# Patient Record
Sex: FEMALE | Race: WHITE | NOT HISPANIC OR LATINO | Employment: OTHER | URBAN - METROPOLITAN AREA
[De-identification: names, ages, dates, MRNs, and addresses within clinical notes are randomized per-mention and may not be internally consistent; named-entity substitution may affect disease eponyms.]

---

## 2020-06-29 ENCOUNTER — HISTORICAL (OUTPATIENT)
Dept: RADIOLOGY | Facility: HOSPITAL | Age: 35
End: 2020-06-29

## 2020-09-16 ENCOUNTER — HISTORICAL (OUTPATIENT)
Dept: CARDIOLOGY | Facility: HOSPITAL | Age: 35
End: 2020-09-16

## 2020-12-04 ENCOUNTER — HOSPITAL ENCOUNTER (OUTPATIENT)
Dept: OBSTETRICS AND GYNECOLOGY | Facility: HOSPITAL | Age: 35
End: 2020-12-06
Attending: OBSTETRICS & GYNECOLOGY | Admitting: OBSTETRICS & GYNECOLOGY

## 2020-12-04 LAB
ABS NEUT (OLG): 4.7 X10(3)/MCL (ref 2.1–9.2)
ALBUMIN SERPL-MCNC: 3.9 GM/DL (ref 3.5–5)
ALBUMIN/GLOB SERPL: 1.4 RATIO (ref 1.1–2)
ALP SERPL-CCNC: 78 UNIT/L (ref 40–150)
ALT SERPL-CCNC: 20 UNIT/L (ref 0–55)
ANISOCYTOSIS BLD QL SMEAR: ABNORMAL
APPEARANCE, UA: CLEAR
APTT PPP: 24.2 SECOND(S) (ref 23.2–33.7)
AST SERPL-CCNC: 14 UNIT/L (ref 5–34)
BACTERIA SPEC CULT: ABNORMAL /HPF
BASOPHILS # BLD AUTO: 0.1 X10(3)/MCL (ref 0–0.2)
BASOPHILS NFR BLD AUTO: 2 %
BILIRUB SERPL-MCNC: 0.3 MG/DL
BILIRUB UR QL STRIP: NEGATIVE
BILIRUBIN DIRECT+TOT PNL SERPL-MCNC: 0.1 MG/DL (ref 0–0.5)
BILIRUBIN DIRECT+TOT PNL SERPL-MCNC: 0.2 MG/DL (ref 0–0.8)
BUN SERPL-MCNC: 8.4 MG/DL (ref 7–18.7)
CALCIUM SERPL-MCNC: 8.7 MG/DL (ref 8.4–10.2)
CHLORIDE SERPL-SCNC: 107 MMOL/L (ref 98–107)
CO2 SERPL-SCNC: 23 MMOL/L (ref 22–29)
COLOR UR: YELLOW
CREAT SERPL-MCNC: 0.71 MG/DL (ref 0.55–1.02)
CROSSMATCH INTERPRETATION: NORMAL
EOSINOPHIL # BLD AUTO: 0.1 X10(3)/MCL (ref 0–0.9)
EOSINOPHIL NFR BLD AUTO: 2 %
ERYTHROCYTE [DISTWIDTH] IN BLOOD BY AUTOMATED COUNT: 15.5 % (ref 11.5–17)
GLOBULIN SER-MCNC: 2.7 GM/DL (ref 2.4–3.5)
GLUCOSE (UA): NEGATIVE
GLUCOSE SERPL-MCNC: 124 MG/DL (ref 74–100)
GROUP & RH: NORMAL
HCT VFR BLD AUTO: 22.2 % (ref 37–47)
HGB BLD-MCNC: 6 GM/DL (ref 12–16)
HGB UR QL STRIP: ABNORMAL
HYPOCHROMIA BLD QL SMEAR: ABNORMAL
IMM GRANULOCYTES # BLD AUTO: 0 10*3/UL
IMM GRANULOCYTES NFR BLD AUTO: 3 %
INR PPP: 1 (ref 0–1.3)
KETONES UR QL STRIP: NEGATIVE
LEUKOCYTE ESTERASE UR QL STRIP: NEGATIVE
LYMPHOCYTES # BLD AUTO: 1.2 X10(3)/MCL (ref 0.6–4.6)
LYMPHOCYTES NFR BLD AUTO: 18 %
MCH RBC QN AUTO: 20.5 PG (ref 27–31)
MCHC RBC AUTO-ENTMCNC: 27 GM/DL (ref 33–36)
MCV RBC AUTO: 75.8 FL (ref 80–94)
MICROCYTES BLD QL SMEAR: ABNORMAL
MONOCYTES # BLD AUTO: 0.4 X10(3)/MCL (ref 0.1–1.3)
MONOCYTES NFR BLD AUTO: 6 %
NEUTROPHILS # BLD AUTO: 4.7 X10(3)/MCL (ref 2.1–9.2)
NEUTROPHILS NFR BLD AUTO: 69 %
NITRITE UR QL STRIP: NEGATIVE
PH UR STRIP: 7.5 [PH] (ref 5–9)
PLATELET # BLD AUTO: 257 X10(3)/MCL (ref 130–400)
PLATELET # BLD EST: NORMAL 10*3/UL
PMV BLD AUTO: 10.5 FL (ref 9.4–12.4)
POIKILOCYTOSIS BLD QL SMEAR: ABNORMAL
POLYCHROMASIA BLD QL SMEAR: ABNORMAL
POTASSIUM SERPL-SCNC: 4.3 MMOL/L (ref 3.5–5.1)
PRODUCT READY: NORMAL
PROT SERPL-MCNC: 6.6 GM/DL (ref 6.4–8.3)
PROT UR QL STRIP: NEGATIVE
PROTHROMBIN TIME: 13.3 SECOND(S) (ref 11.1–13.7)
RBC # BLD AUTO: 2.93 X10(6)/MCL (ref 4.2–5.4)
RBC #/AREA URNS HPF: 5 /HPF (ref 0–2)
RBC MORPH BLD: ABNORMAL
SODIUM SERPL-SCNC: 141 MMOL/L (ref 136–145)
SP GR UR STRIP: 1.01 (ref 1–1.03)
SQUAMOUS EPITHELIAL, UA: ABNORMAL
UROBILINOGEN UR STRIP-ACNC: 0.2
WBC # SPEC AUTO: 6.8 X10(3)/MCL (ref 4.5–11.5)
WBC #/AREA URNS HPF: ABNORMAL /[HPF]

## 2020-12-05 LAB
ABS NEUT (OLG): 3.53 X10(3)/MCL (ref 2.1–9.2)
ANISOCYTOSIS BLD QL SMEAR: 1
BASOPHILS NFR BLD MANUAL: 4 % (ref 0–2)
EOSINOPHIL NFR BLD MANUAL: 4 % (ref 0–8)
ERYTHROCYTE [DISTWIDTH] IN BLOOD BY AUTOMATED COUNT: 17.2 % (ref 11.5–17)
HCT VFR BLD AUTO: 25.6 % (ref 37–47)
HGB BLD-MCNC: 7.4 GM/DL (ref 12–16)
HYPOCHROMIA BLD QL SMEAR: 1
LYMPHOCYTES NFR BLD MANUAL: 9 % (ref 13–40)
MACROCYTES BLD QL SMEAR: 1 /MCL
MCH RBC QN AUTO: 22.7 PG (ref 27–31)
MCHC RBC AUTO-ENTMCNC: 28.9 GM/DL (ref 33–36)
MCV RBC AUTO: 78.5 FL (ref 80–94)
MICROCYTES BLD QL SMEAR: 1
MONOCYTES NFR BLD MANUAL: 1 % (ref 2–11)
NEUTROPHILS NFR BLD MANUAL: 82 % (ref 47–80)
PLATELET # BLD AUTO: 239 X10(3)/MCL (ref 130–400)
PLATELET # BLD EST: ADEQUATE 10*3/UL
PMV BLD AUTO: 10.6 FL (ref 9.4–12.4)
POLYCHROMASIA BLD QL SMEAR: 1
PRODUCT READY: NORMAL
RBC # BLD AUTO: 3.26 X10(6)/MCL (ref 4.2–5.4)
RBC MORPH BLD: ABNORMAL
WBC # SPEC AUTO: 5.8 X10(3)/MCL (ref 4.5–11.5)

## 2020-12-06 LAB
ABS NEUT (OLG): 3.58 X10(3)/MCL (ref 2.1–9.2)
BASOPHILS # BLD AUTO: 0.1 X10(3)/MCL (ref 0–0.2)
BASOPHILS NFR BLD AUTO: 2 %
EOSINOPHIL # BLD AUTO: 0.2 X10(3)/MCL (ref 0–0.9)
EOSINOPHIL NFR BLD AUTO: 4 %
ERYTHROCYTE [DISTWIDTH] IN BLOOD BY AUTOMATED COUNT: 17.3 % (ref 11.5–17)
HCT VFR BLD AUTO: 30.9 % (ref 37–47)
HGB BLD-MCNC: 9.3 GM/DL (ref 12–16)
LYMPHOCYTES # BLD AUTO: 1.3 X10(3)/MCL (ref 0.6–4.6)
LYMPHOCYTES NFR BLD AUTO: 22 %
MCH RBC QN AUTO: 24.2 PG (ref 27–31)
MCHC RBC AUTO-ENTMCNC: 30.1 GM/DL (ref 33–36)
MCV RBC AUTO: 80.3 FL (ref 80–94)
MONOCYTES # BLD AUTO: 0.4 X10(3)/MCL (ref 0.1–1.3)
MONOCYTES NFR BLD AUTO: 6 %
NEUTROPHILS # BLD AUTO: 3.58 X10(3)/MCL (ref 2.1–9.2)
NEUTROPHILS NFR BLD AUTO: 60 %
PLATELET # BLD AUTO: 253 X10(3)/MCL (ref 130–400)
PMV BLD AUTO: 10.5 FL (ref 9.4–12.4)
RBC # BLD AUTO: 3.85 X10(6)/MCL (ref 4.2–5.4)
WBC # SPEC AUTO: 6 X10(3)/MCL (ref 4.5–11.5)

## 2021-01-19 ENCOUNTER — HISTORICAL (OUTPATIENT)
Dept: BARIATRICS | Facility: HOSPITAL | Age: 36
End: 2021-01-19

## 2021-01-27 ENCOUNTER — HISTORICAL (OUTPATIENT)
Dept: BARIATRICS | Facility: HOSPITAL | Age: 36
End: 2021-01-27

## 2021-01-28 ENCOUNTER — HISTORICAL (OUTPATIENT)
Dept: ADMINISTRATIVE | Facility: HOSPITAL | Age: 36
End: 2021-01-28

## 2021-02-11 ENCOUNTER — HISTORICAL (OUTPATIENT)
Dept: BARIATRICS | Facility: HOSPITAL | Age: 36
End: 2021-02-11

## 2021-06-01 ENCOUNTER — HISTORICAL (OUTPATIENT)
Dept: BARIATRICS | Facility: HOSPITAL | Age: 36
End: 2021-06-01

## 2021-08-23 ENCOUNTER — HISTORICAL (OUTPATIENT)
Dept: BARIATRICS | Facility: HOSPITAL | Age: 36
End: 2021-08-23

## 2021-11-01 ENCOUNTER — HISTORICAL (OUTPATIENT)
Dept: BARIATRICS | Facility: HOSPITAL | Age: 36
End: 2021-11-01

## 2022-04-30 NOTE — H&P
Patient:   Lisa Celestin            MRN: [id number]            FIN: [id number]               Age:   35 years     Sex:  Female     :  1985   Associated Diagnoses:   Menorrhagia   Author:   Marnie Roa MD      Basic Information   Source of history:  Self.    Referral source:  Self.    History limitation:  None.       Chief Complaint   menorrhagia        History of Present Illness   Miss Celestin is a 35-year-old white female who presents to the emergency department with excessive uterine bleeding and symptomatic anemia. She gives a history of heavy menstrual cycles and was started on prog only ocp Slynd approx 4 weeks ago. She felt that this initially did control bleeding but since she got the to last row of the pills this week, the bleeding increased and she began to feel weak, SOB.       Review of Systems   All other systems.     Health Status   Allergies:    Allergic Reactions (Selected)  No Known Allergies,    Allergies (1) Active Reaction  No Known Allergies None Documented     Current medications:  (Selected)   Inpatient Medications  Ordered  Zofran: 4 mg, form: Injection, IV Push, q4hr PRN for nausea, first dose 20 19:11:00 CST  acetaminophen: 1,000 mg, form: Tab, Oral, q6hr PRN for pain, mild, first dose 20 19:11:00 CST  ibuprofen 600 mg oral tablet: 600 mg, form: Tab, Oral, q6hr PRN for pain, first dose 20 19:12:00 CST  tranexamic acid: 1,300 mg, form: Tab, Oral, TID, first dose 20 14:00:00 CST, give first PO dose 8 hours after IV dose  Prescriptions  Prescribed  ibuprofen 600 mg oral tablet: 600 mg = 1 tab(s), Oral, q6hr, PRN PRN pain, # 30 tab(s), 0 Refill(s), Pharmacy: [facility] [id number], 165, cm, Height/Length Dosing, 20 22:42:00 CST, 113, kg, Weight Dosing, 20 22:42:00 CST  tranexamic acid 650 mg oral tablet: 1,300 mg = 2 tab(s), Oral, TID, for a maximum of 5 days during monthly menstruation, # 180 tab(s), 0 Refill(s), Pharmacy:  [facility] 5773, 165, cm, Height/Length Dosing, 20 22:42:00 CST, 113, kg, Weight Dosing, 20 22:42:00...   Problem list:    All Problems  ulcers disese / SNOMED CT 0263260936 / Confirmed  Morbid obesity / SNOMED CT 779489859 / Probable  Reflux / SNOMED CT 12427003 / Confirmed  Right ankle pain / ICD-9-.47 / Confirmed  Right knee pain / ICD-9-.46 / Confirmed      Histories   Past Medical History:    Active  Reflux (40135303)  ulcers disese (9075856170)  Resolved  GERD - Gastro-esophageal reflux disease (9252682981):  Resolved.  PIH - Pregnancy-induced hypertension (439053855):  Resolved.  pregnancy (1857606760):  Resolved.  Cholelithiasis (758248486):  Resolved.  Migraines ([id number]):  Resolved.   Family History:    Entire family history is negative.   Procedure history:     Section (None) on 2016 at 30 Years.  Comments:  2016 9:03 CST - Charla Garnett  auto-populated from documented surgical case  C section ([id number]) in  at 27 Years.  Tonsillectomy (487731320).  upper GI series.  History of tonsillectomy (9493299362).  Cholecystectomy (32815367).   Social History        Social & Psychosocial Habits    Alcohol  2012 Risk Assessment: Denies Alcohol Use    2016  Type: Beer    Frequency: 1-2 times per week    Employment/School  10/02/2012  Highest education: Post graduate degree(s)    2012  Status: Employed    Highest education: University degree(s)    Nutrition/Health  2012  Type of diet: Regular    Caffeine intake amount: coke 1 / day    Sleeping concerns: No    Feels highly stressed: No    Substance Use  2012 Risk Assessment: Denies Substance Abuse    Tobacco  2012 Risk Assessment: Denies Tobacco Use    2020  Use: Never (less than 100 in l    Patient Wants Consult For Cessation Counseling No    Abuse/Neglect  2020  SHX Any signs of abuse or neglect  No    Feels unsafe at home: No    Safe place to go: Yes  .        Physical Examination   Vital Signs   12/6/2020 8:15 CST       Temperature Oral          36.6 DegC                             Temperature Oral (calculated)             97.88 DegF                             Peripheral Pulse Rate     85 bpm                             Respiratory Rate          20 br/min                             Systolic Blood Pressure   131 mmHg                             Diastolic Blood Pressure  83 mmHg                             Mean Arterial Pressure, Cuff              99 mmHg    12/6/2020 4:00 CST       Temperature Oral          36.5 DegC                             Temperature Oral (calculated)             97.70 DegF                             Peripheral Pulse Rate     98 bpm                             Systolic Blood Pressure   126 mmHg                             Diastolic Blood Pressure  78 mmHg                             Mean Arterial Pressure, Cuff              94 mmHg    12/5/2020 23:20 CST      Temperature Oral          36.7 DegC                             Temperature Oral (calculated)             98.06 DegF                             Peripheral Pulse Rate     103 bpm  HI                             Systolic Blood Pressure   134 mmHg                             Diastolic Blood Pressure  88 mmHg                             Mean Arterial Pressure, Cuff              103 mmHg    12/5/2020 20:29 CST      Temperature Oral          36.9 DegC                             Temperature Oral (calculated)             98.42 DegF                             Peripheral Pulse Rate     90 bpm                             Systolic Blood Pressure   131 mmHg                             Diastolic Blood Pressure  83 mmHg                             Mean Arterial Pressure, Cuff              99 mmHg    12/5/2020 19:50 CST      Temperature Oral          36.8 DegC                             Temperature Oral (calculated)             98.24  DegF                             Peripheral Pulse Rate     94 bpm                             Systolic Blood Pressure   133 mmHg                             Diastolic Blood Pressure  86 mmHg    12/5/2020 17:10 CST      Temperature Oral          36.8 DegC                             Heart Rate Monitored      92 bpm                             Systolic Blood Pressure   134 mmHg                             Diastolic Blood Pressure  83 mmHg                             Mean Arterial Pressure, Cuff              100 mmHg    12/5/2020 17:00 CST      Temperature Oral          36.9 DegC                             Heart Rate Monitored      97 bpm                             Systolic Blood Pressure   130 mmHg                             Diastolic Blood Pressure  90 mmHg                             Mean Arterial Pressure, Cuff              103 mmHg    12/5/2020 16:54 CST      Temperature Oral          36.9 DegC                             Temperature Oral (calculated)             98.42 DegF                             Heart Rate Monitored      97                             Systolic Blood Pressure   123                             Diastolic Blood Pressure  78    12/5/2020 16:30 CST      Temperature Oral          37.1 DegC                             Temperature Oral (calculated)             98.78 DegF                             Heart Rate Monitored      97                             Systolic Blood Pressure   132                             Diastolic Blood Pressure  84    12/5/2020 16:25 CST      Temperature Oral          37 DegC                             Temperature Oral (calculated)             98.60 DegF                             Peripheral Pulse Rate     96 bpm                             Respiratory Rate          20 br/min                             Systolic Blood Pressure   135 mmHg                             Diastolic Blood Pressure  76 mmHg                             Mean Arterial Pressure, Cuff              96  mmHg    12/5/2020 13:23 CST      Temperature Oral          36.8 DegC                             Temperature Oral (calculated)             98.24 DegF                             Heart Rate Monitored      97 bpm                             Respiratory Rate          16 br/min                             Systolic Blood Pressure   124 mmHg                             Diastolic Blood Pressure  79 mmHg                             Mean Arterial Pressure, Cuff              94 mmHg    12/5/2020 13:05 CST      Temperature Oral          36.8 DegC                             Temperature Oral (calculated)             98.24 DegF                             Heart Rate Monitored      97                             Respiratory Rate          16                             Systolic Blood Pressure   126                             Diastolic Blood Pressure  71    12/5/2020 12:33 CST      Temperature Oral          36.8 DegC                             Temperature Oral (calculated)             98.24 DegF                             Peripheral Pulse Rate     99 bpm                             Respiratory Rate          20 br/min                             Systolic Blood Pressure   136 mmHg                             Diastolic Blood Pressure  89 mmHg                             Mean Arterial Pressure, Cuff              105 mmHg    12/5/2020 8:01 CST       Temperature Oral          36.7 DegC                             Temperature Oral (calculated)             98.06 DegF                             Peripheral Pulse Rate     96 bpm                             Respiratory Rate          20 br/min                             Systolic Blood Pressure   150 mmHg  HI                             Diastolic Blood Pressure  95 mmHg  HI                             Mean Arterial Pressure, Cuff              113 mmHg    12/5/2020 8:00 CST       Oxygen Therapy            Room air    12/5/2020 4:00 CST       Temperature Oral          36.9 DegC                              Temperature Oral (calculated)             98.42 DegF                             Peripheral Pulse Rate     94 bpm                             Systolic Blood Pressure   130 mmHg                             Diastolic Blood Pressure  79 mmHg                             Mean Arterial Pressure, Cuff              96 mmHg    12/5/2020 0:41 CST       Temperature Oral          36.7 DegC                             Temperature Oral (calculated)             98.06 DegF                             Peripheral Pulse Rate     99 bpm                             Systolic Blood Pressure   112 mmHg                             Diastolic Blood Pressure  68 mmHg                             Mean Arterial Pressure, Cuff              83 mmHg     Additional physical exam information:  GEN: A&O x 4  CV: RRR  Lungs: CTAB  ABD: S/NT/ND  Ext: no edema   , : Examination the clare-pad revealed some small amount of blood no blood clots noted..       Review / Management   Results review:     Labs (Last four charted values)  WBC                  6.0 (DEC 06) 5.8 (DEC 05) 6.8 (DEC 04)   Hgb                  L 9.3 (DEC 06) L 7.4 (DEC 05) L 6.0 (DEC 04)   Hct                  L 30.9 (DEC 06) L 25.6 (DEC 05) L 22.2 (DEC 04)   Plt                  253 (DEC 06) 239 (DEC 05) 257 (DEC 04)   Na                   141 (DEC 04)   K                    4.3 (DEC 04)   CO2                  23 (DEC 04)   Cl                   107 (DEC 04)   Cr                   0.71 (DEC 04)   BUN                  8.4 (DEC 04)   Glucose Random       H 124 (DEC 04)   PT                   13.3 (DEC 04)   INR                  1.0 (DEC 04)   PTT                  24.2 (DEC 04) .       Impression and Plan   Diagnosis     Menorrhagia (XIA09-OK N92.0).     Course:  s/p 4 units PRBC adn appropriate improvement in counts  pt states bleeding is imprtoving on TXA  will discharge home on this same med  hold Slynd for now  f/u in office for EMB and discussion of further mgmt.

## 2022-04-30 NOTE — OP NOTE
Patient:   Lisa Celestin            MRN: [id number]            FIN: [id number]               Age:   35 years     Sex:  Female     :  1985   Associated Diagnoses:   HEARTBURN; Hernia, hiatal   Author:   Ted Higginbotham MD      Operative Note   Operative Information   Date/ Time:  2021 22:22:00.     Procedures Performed: Procedure Code   89446- EGD FLEX TRANSORAL DX W/BRUSHING/WASHING (None) on 2021 at 35 Years.  Comments:  2021 13:19 BRADEN - Nish العراقي, Danii Decker  auto-populated from documented surgical case, Esophagogastroduodenoscopy.     Preoperative Diagnosis: HEARTBURN ([id number] R12).     Postoperative Diagnosis: HEARTBURN (NDB13-AM R12), Hernia, hiatal (WXA71-SM K44.9).     Surgeon: Ted Higginbotham MD.     Anesthesia: MAC.     Description of Procedure/Findings/    Complications: Patient was taken to the OR.  The patient's throat was aneshtetized.  MAC was utilized for anesthesia.   was easily passed.  Findings were as follows:  -Oropharynx: normal  -Airway: normal  -Esophagus: normal  -Stomach: normal mucosa   -Duodenum: normal.     Esimated blood loss: No blood loss.     Complications: None.

## 2022-04-30 NOTE — ED PROVIDER NOTES
"   Patient:   Lisa Celestin            MRN: [id number]            FIN: [id number]               Age:   35 years     Sex:  Female     :  1985   Associated Diagnoses:   Symptomatic anemia; Menorrhagia   Author:   Natalie Jacobson MD      Basic Information   Time seen: Date & time 2020 12:52:00.   History source: Patient.   Arrival mode: Private vehicle.   History limitation: None.   Additional information: Chief Complaint from Nursing Triage Note : Chief Complaint   2020 12:50 CST      Chief Complaint           Pt to ED states sent by MD for low h/h 6.3/22.7. States she was told she would need to have transfusion done. States having inc vaginal bleeding for past few "months". inc sob today.  (Modified) .   Provider/Visit info:    No qualifying data available.   .   History of Present Illness   The patient presents with weakness, dizziness.  The onset was chronic.  The course/duration of symptoms is worsening.  Location: generalized. The character of symptoms is bleeding.     The patient presents with 36y/o F presents to the ED for abdnormal H&H of /. States vaginal bleeding for the past couple of month. Sent her by Dr. Roa. Deepak SAENZ and INatalie MD, assumed care of this patient.     35-year-old female presents the emergency department recommendation of her ObGyn for evaluation of her outpatient labs demonstrated significant anemia in the context of several months of uncontrolled vaginal bleeding.  She was seen by her ObGyn a few weeks ago and reportedly had an ultrasound performed at that time with no acute findings.  She has been on birth control for 1 month but has not noted significant difference in her symptoms.  Reports that her dyspnea has been getting worse.  She denies any chest pain.  She does have some orthostatic dizziness as well.  Reports that the bleeding is actually on the lighter side of her experience however it is still quite heavy..        Review " of Systems   Constitutional symptoms:  Weakness, fatigue, No fever,    Respiratory symptoms:  Shortness of breath, No cough,    Cardiovascular symptoms:  No chest pain,    Gastrointestinal symptoms:  No abdominal pain, no nausea, no vomiting.    Genitourinary symptoms:  Vaginal bleeding.   Neurologic symptoms:  Dizziness.             Additional review of systems information: All other systems reviewed and otherwise negative.      Health Status   Allergies:    Allergic Reactions (Selected)  No Known Allergies,    Allergies (1) Active Reaction  No Known Allergies None Documented  .   Medications:  (Selected)   Documented Medications  Documented  Percocet 5/325: 1 tab(s), Oral, q6hr, PRN PRN pain, 0 Refill(s)  Percocet 5/325: 2 tab(s), Oral, q6hr, PRN PRN pain, 0 Refill(s)  omeprazole 40 mg oral delayed release capsule: 40 mg = 1 cap(s), Oral, Daily, # 30 cap(s), 0 Refill(s)  ranitidine: 1 tab, Oral, At Bedtime, 0 Refill(s)  sertraline 50 mg oral capsule: Daily, 0 Refill(s).      Past Medical/ Family/ Social History   Medical history:    Active  Reflux (19277622)  ulcers disese (8219603719)  Resolved  GERD - Gastro-esophageal reflux disease (6274971294):  Resolved.  PIH - Pregnancy-induced hypertension (902520101):  Resolved.  pregnancy (2384629990):  Resolved.  Cholelithiasis (884087709):  Resolved.  Migraines ([id number]):  Resolved., Reviewed as documented in chart.   Surgical history:     Section (None) on 2016 at 30 Years.  Comments:  2016 9:03 BRADEN - Charla Garnett  auto-populated from documented surgical case  C section ([id number]) in  at 27 Years.  Tonsillectomy (452757803).  upper GI series.  History of tonsillectomy (4501786156).  Cholecystectomy (39633659)., Reviewed as documented in chart.   Family history:    Entire family history is negative..   Social history:    Social & Psychosocial Habits    Alcohol  2012 Risk  Assessment: Denies Alcohol Use    02/16/2016  Type: Beer    Frequency: 1-2 times per week    Employment/School  10/02/2012  Highest education: Post graduate degree(s)    02/27/2012  Status: Employed    Highest education: University degree(s)    Nutrition/Health  02/27/2012  Type of diet: Regular    Caffeine intake amount: coke 1 / day    Sleeping concerns: No    Feels highly stressed: No    Substance Use  02/27/2012 Risk Assessment: Denies Substance Abuse    Tobacco  02/27/2012 Risk Assessment: Denies Tobacco Use  , Reviewed as documented in chart.      Physical Examination               Vital Signs   Vital Signs   12/4/2020 12:50 CST      Temperature Oral          37.3 DegC                             Temperature Oral (calculated)             99.14 DegF                             Peripheral Pulse Rate     122 bpm  HI                             Respiratory Rate          18 br/min                             SpO2                      100 %                             Systolic Blood Pressure   154 mmHg  HI                             Diastolic Blood Pressure  80 mmHg  .      Vital Signs (last 24 hrs)_____  Last Charted___________  Temp Oral     37.3 DegC  (DEC 04 12:50)  Heart Rate Peripheral   H 122bpm  (DEC 04 12:50)  Resp Rate         18 br/min  (DEC 04 12:50)  SBP      H 154mmHg  (DEC 04 12:50)  DBP      80 mmHg  (DEC 04 12:50)  SpO2      100 %  (DEC 04 12:50)  .   Oxygen saturation.   General:  Alert, no acute distress.    Skin:  Warm, dry, pale.    Head:  Normocephalic, atraumatic.    Neck:  Supple, trachea midline.    Eye:  Conjunctiva: Both eyes, pale.   Ears, nose, mouth and throat:  Oral mucosa moist.   Cardiovascular:  Regular rate and rhythm, Normal peripheral perfusion, No edema, Tachycardia.    Respiratory:  Respirations are non-labored.   Gastrointestinal:  Soft, Nontender, Non distended.    Neurological:  Alert and oriented to person, place, time, and situation.   Psychiatric:  Cooperative.       Medical Decision Making   Rationale:  Patient referred to ED with symptomatic anemia due to menorrhagia for the last several months.  Hemoglobin 6, she is tachycardic but normotensive. Discussed with her OBGYN who agreed w/ admission under observation for transfusion. Findings and plan discussed with the patient, and she is agreeable to admission at this time.   .   Documents reviewed:  Emergency department nurses' notes.   Orders  Launch Orders   Laboratory:  UA with Reflex (Order): Stat collect, Urine, 12/4/2020 12:54 CST, Nurse collect, Print Label By Order Location  POC UPT ED (Order): Urine, Stat collect, Collected, 12/4/2020 12:54 CST by Yecenia SAENZ, Debbie ARAGON Nurse collect, Print Label By Order Location  PTT (Order): Stat collect, 12/4/2020 12:54 CST, Blood, Lab Collect, Print Label By Order Location, 12/4/2020 12:54 CST  PT (Order): Stat collect, 12/4/2020 12:54 CST, Blood, Lab Collect, Print Label By Order Location, 12/4/2020 12:54 CST  Type and Crossmatch 1st order (Order): 12/4/2020 12:53 CST, Stat collect, Blood, Lab Collect, Packed RBC, To Keep Ahead, 2, 12/4/2020, Print Label By Order Location  Type and Rh (Order Processing)  Antibody Screen for transfusion  (Indirect Binh) (Order Processing)  Xmatch (Order Processing)  Red Blood Cells (Order Processing)  CMP (Order): Stat collect, 12/4/2020 12:53 CST, Blood, Lab Collect, Print Label By Order Location, 12/4/2020 12:53 CST  CBC w/ Auto Diff (Order): Now collect, 12/4/2020 12:53 CST, Blood, Lab Collect, Print Label By Order Location, 12/4/2020 12:53 CST.   Results review:  Lab results : Lab View   12/4/2020 13:55 CST      Est Creat Clearance Ser   101.25 mL/min    12/4/2020 13:45 CST      U beta hCG Ql POC         Negative    12/4/2020 13:15 CST      UA Appear                 CLEAR                             UA Color                  YELLOW                             UA Spec Grav              1.007                             UA Bili                    Negative                             UA pH                     7.5                             UA Urobilinogen           0.2                             UA Blood                  2+                             UA Glucose                Negative                             UA Ketones                Negative                             UA Protein                Negative                             UA Nitrite                Negative                             UA Leuk Est               Negative                             UA WBC                    NONE SEEN                             UA RBC                    5 /HPF  HI                             UA Bacteria               NONE SEEN /HPF                             UA Squam Epithelial       NONE SEEN    12/4/2020 13:02 CST      Sodium Lvl                141 mmol/L                             Potassium Lvl             4.3 mmol/L                             Chloride                  107 mmol/L                             CO2                       23 mmol/L                             Calcium Lvl               8.7 mg/dL                             Glucose Lvl               124 mg/dL  HI                             BUN                       8.4 mg/dL                             Creatinine                0.71 mg/dL                             eGFR-AA                   >60  NA                             eGFR-JULITO                  >60 mL/min/1.73 m2  NA                             Bili Total                0.3 mg/dL                             Bili Direct               0.1 mg/dL                             Bili Indirect             0.20 mg/dL                             AST                       14 unit/L                             ALT                       20 unit/L                             Alk Phos                  78 unit/L                             Total Protein             6.6 gm/dL                             Albumin Lvl               3.9 gm/dL                              Globulin                  2.7 gm/dL                             A/G Ratio                 1.4 ratio                             PT                        13.3 second(s)                             INR                       1.0                             PTT                       24.2 second(s)                             WBC                       6.8 x10(3)/mcL                             RBC                       2.93 x10(6)/mcL  LOW                             Hgb                       6.0 gm/dL  LOW                             Hct                       22.2 %  LOW                             Platelet                  257 x10(3)/mcL                             MCV                       75.8 fL  LOW                             MCH                       20.5 pg  LOW                             MCHC                      27.0 gm/dL  LOW                             RDW                       15.5 %                             MPV                       10.5 fL                             Abs Neut                  4.70 x10(3)/mcL                             Neutro Auto               69 %  NA                             Lymph Auto                18 %  NA                             Mono Auto                 6 %  NA                             Eos Auto                  2 %  NA                             Abs Eos                   0.1 x10(3)/mcL                             Basophil Auto             2 %  NA                             Abs Neutro                4.70 x10(3)/mcL                             Abs Lymph                 1.2 x10(3)/mcL                             Abs Mono                  0.4 x10(3)/mcL                             Abs Baso                  0.1 x10(3)/mcL                             IG%                       3  NA                             IG#                       0  NA                             Hypochrom                 1+                             Platelet Est               Normal                             Anisocyte                 1+                             Poik                      1+                             Microcyte                 1+                             Polychrom                 1+                             RBC Morph                 Abnormal                             ABO/Rh                    A POS                             Antibody Screen           Neg                             Product Ready             2 AVAILABLE  , Interpretation Abnormal results  microcytic anemia.       Impression and Plan   Diagnosis   Symptomatic anemia (HTW00-RC D64.9)   Menorrhagia (NJM50-KZ N92.0)   Plan   Condition: Stable.    Disposition: Admit time  12/4/2020 15:11:00, Place in Observation Unit, Crispin JOHNSON, Marnie BOWMAN    Counseled: Patient, Regarding diagnosis, Regarding diagnostic results, Regarding treatment plan, Patient indicated understanding of instructions.

## 2022-04-30 NOTE — DISCHARGE SUMMARY
Patient:   Lisa Celestin            MRN: [id number]            FIN: [id number]               Age:   35 years     Sex:  Female     :  1985   Associated Diagnoses:   None   Author:   Marnie Roa MD      Admission Date:   20  Discharge Date: 20    Principle Diagnosis: DUB, acute blood loss anemia  Principle Procedure:  transfusion PRBC    Brief History and Hospital course:   Admitted with symptomatic anemia due to heavy menstrual bleeding  transfused 4 units PRBC with appropriate rise in H/H  bleeding controlled with TXA  d/c home with precautions and close f/u

## 2023-07-27 DIAGNOSIS — K44.9 ESOPHAGEAL HIATAL HERNIA: Primary | ICD-10-CM

## 2023-07-27 DIAGNOSIS — K20.90 ESOPHAGITIS: ICD-10-CM

## 2023-08-02 ENCOUNTER — HOSPITAL ENCOUNTER (OUTPATIENT)
Dept: RADIOLOGY | Facility: HOSPITAL | Age: 38
Discharge: HOME | End: 2023-08-02
Attending: INTERNAL MEDICINE
Payer: COMMERCIAL

## 2023-08-02 DIAGNOSIS — K44.9 ESOPHAGEAL HIATAL HERNIA: ICD-10-CM

## 2023-08-02 DIAGNOSIS — K20.90 ESOPHAGITIS: ICD-10-CM

## 2023-08-02 PROCEDURE — A9698 NON-RAD CONTRAST MATERIALNOC: HCPCS | Performed by: INTERNAL MEDICINE

## 2023-08-02 PROCEDURE — 74248 X-RAY SM INT F-THRU STD: CPT | Mod: TC

## 2023-08-02 PROCEDURE — 25500020 PHARM REV CODE 255: Performed by: INTERNAL MEDICINE

## 2023-08-02 PROCEDURE — 74240 X-RAY XM UPR GI TRC 1CNTRST: CPT | Mod: TC

## 2023-08-02 RX ADMIN — BARIUM SULFATE 320 ML: 0.6 SUSPENSION ORAL at 12:08

## 2023-08-02 RX ADMIN — Medication 176 G: at 12:08

## 2023-09-28 ENCOUNTER — TELEPHONE (OUTPATIENT)
Dept: SURGERY | Facility: CLINIC | Age: 38
End: 2023-09-28
Payer: COMMERCIAL

## 2023-09-28 DIAGNOSIS — Z91.89 ELECTROLYTE IMBALANCE RISK: ICD-10-CM

## 2023-09-28 DIAGNOSIS — Z13.21 ENCOUNTER FOR VITAMIN DEFICIENCY SCREENING: ICD-10-CM

## 2023-09-28 DIAGNOSIS — Z13.0 SCREENING FOR IRON DEFICIENCY ANEMIA: Primary | ICD-10-CM

## 2023-09-28 NOTE — TELEPHONE ENCOUNTER
Pt coming in for upcoming appt. No labs recently done. Spoke with pt she will go to [facility] in [place] next week. Orders placed

## 2023-10-10 ENCOUNTER — OFFICE VISIT (OUTPATIENT)
Dept: SURGERY | Facility: CLINIC | Age: 38
End: 2023-10-10
Payer: COMMERCIAL

## 2023-10-10 VITALS
BODY MASS INDEX: 32.49 KG/M2 | DIASTOLIC BLOOD PRESSURE: 92 MMHG | HEART RATE: 79 BPM | SYSTOLIC BLOOD PRESSURE: 131 MMHG | WEIGHT: 195 LBS | HEIGHT: 65 IN

## 2023-10-10 DIAGNOSIS — K21.9 GASTROESOPHAGEAL REFLUX DISEASE, UNSPECIFIED WHETHER ESOPHAGITIS PRESENT: Primary | ICD-10-CM

## 2023-10-10 PROCEDURE — 3008F BODY MASS INDEX DOCD: CPT | Mod: CPTII,,, | Performed by: SURGERY

## 2023-10-10 PROCEDURE — 99214 OFFICE O/P EST MOD 30 MIN: CPT | Mod: ,,, | Performed by: SURGERY

## 2023-10-10 PROCEDURE — 1159F PR MEDICATION LIST DOCUMENTED IN MEDICAL RECORD: ICD-10-PCS | Mod: CPTII,,, | Performed by: SURGERY

## 2023-10-10 PROCEDURE — 3075F PR MOST RECENT SYSTOLIC BLOOD PRESS GE 130-139MM HG: ICD-10-PCS | Mod: CPTII,,, | Performed by: SURGERY

## 2023-10-10 PROCEDURE — 3080F PR MOST RECENT DIASTOLIC BLOOD PRESSURE >= 90 MM HG: ICD-10-PCS | Mod: CPTII,,, | Performed by: SURGERY

## 2023-10-10 PROCEDURE — 1159F MED LIST DOCD IN RCRD: CPT | Mod: CPTII,,, | Performed by: SURGERY

## 2023-10-10 PROCEDURE — 99214 PR OFFICE/OUTPT VISIT, EST, LEVL IV, 30-39 MIN: ICD-10-PCS | Mod: ,,, | Performed by: SURGERY

## 2023-10-10 PROCEDURE — 3080F DIAST BP >= 90 MM HG: CPT | Mod: CPTII,,, | Performed by: SURGERY

## 2023-10-10 PROCEDURE — 3008F PR BODY MASS INDEX (BMI) DOCUMENTED: ICD-10-PCS | Mod: CPTII,,, | Performed by: SURGERY

## 2023-10-10 PROCEDURE — 3075F SYST BP GE 130 - 139MM HG: CPT | Mod: CPTII,,, | Performed by: SURGERY

## 2023-10-10 RX ORDER — DULOXETIN HYDROCHLORIDE 60 MG/1
60 CAPSULE, DELAYED RELEASE ORAL 2 TIMES DAILY
COMMUNITY
Start: 2023-09-25 | End: 2023-11-30

## 2023-10-10 RX ORDER — MONTELUKAST SODIUM 10 MG/1
10 TABLET ORAL DAILY
COMMUNITY
Start: 2023-09-21

## 2023-10-10 RX ORDER — ESOMEPRAZOLE MAGNESIUM 40 MG/1
40 CAPSULE, DELAYED RELEASE ORAL 2 TIMES DAILY
COMMUNITY
Start: 2023-09-15 | End: 2024-02-08 | Stop reason: ALTCHOICE

## 2023-10-10 RX ORDER — BUPROPION HYDROCHLORIDE 150 MG/1
150 TABLET ORAL EVERY MORNING
COMMUNITY
Start: 2023-10-09 | End: 2023-11-30

## 2023-10-10 RX ORDER — TIRZEPATIDE 5 MG/.5ML
0.5 INJECTION, SOLUTION SUBCUTANEOUS
COMMUNITY
Start: 2023-08-31 | End: 2024-02-08 | Stop reason: ALTCHOICE

## 2023-10-10 RX ORDER — LORAZEPAM 0.5 MG/1
.5-1 TABLET ORAL
COMMUNITY
Start: 2023-08-10 | End: 2023-11-30

## 2023-10-10 RX ORDER — FAMOTIDINE 40 MG/1
40 TABLET, FILM COATED ORAL NIGHTLY
COMMUNITY
Start: 2023-08-31 | End: 2024-03-19

## 2023-10-10 RX ORDER — TRAZODONE HYDROCHLORIDE 100 MG/1
100 TABLET ORAL NIGHTLY
COMMUNITY
Start: 2023-09-27

## 2023-10-10 RX ORDER — SUCRALFATE 1 G/1
TABLET ORAL
Status: ON HOLD | COMMUNITY
Start: 2023-07-31 | End: 2024-02-26

## 2023-10-10 NOTE — PROGRESS NOTES
[facility] - General Surgery Services  [address] [place] [id number]  Phone: [phone]  Fax: [phone]     HISTORY & PHYSICAL  General Surgery  Dr. eTd Higginbotham      Patient Name: Lisa Celestin  YOB: 1985  Author: Ted Higginbotham MD     Date: 10/10/2023                   SUBJECTIVE:     Chief Complaint/Reason for Admission:   Chief Complaint   Patient presents with    Follow-up     VSG 2/1/2021        History of Present Illness:  Ms. Lisa Celestin is a 38 y.o. female who presents to clinic for surgical evaluation of intractable reflux.     Positive symptoms:   Heartburn, Regurgitation, Dyspepsia, Upper Abdominal Pain, Nausea, and Vomiting    Previous treatments:   PPI, H2 Antagonist, OTC Antacids, and Rx Carafate    Referring provider: No ref. provider found   Patient Care Team:  Jevon Escobar MD as PCP - General (Family Medicine)     Pertinent workup:    FL Upper GI With Small Bowel (xpd)  Narrative: EXAMINATION:  FL UPPER GI WITH SMALL BOWEL (XPD)    CLINICAL HISTORY:  Diaphragmatic hernia without obstruction or gangrene    TECHNIQUE:   images were obtained of the abdomen.  Patient performed several barium swallows for this exam multiple fluoroscopic images were obtained of the esophagus and the stomach.  Additional oral contrast was given and serial abdomen images acquired.    Fluoroscopy time 1 minutes 22 seconds    Radiation dose 267 mGy    Cumulative DAP 11.06 uGym2    Total of 98 fluoroscopic images were acquired    COMPARISON:  None available    FINDINGS:   images of the abdomen show bilateral upper abdomen metallic clips.  Bowel gas pattern is nonspecific nonobstructive.  No apparent organomegaly.    Timing and emptying of barium from the hypopharynx is unremarkable.  Esophageal unremarkable primary and secondary peristalsis and there were no tertiary waveforms.  There is diffuse mild dilatation of the esophagus  without stricture and there is no diverticulum.  There was gastroesophageal reflux during the exam.  There is no significant hiatal hernia.  There was no aspiration or regurgitation.    Stomach is of small volume related to gastric sleeve.  There is no evidence of gastric sleeve migration above the level of the gastroesophageal junction.  No neofundus identified.  Contrast promptly transited from the stomach into the duodenum.  Duodenum shows unremarkable distensibility and folds.  There was initial rapid transit of contrast through nondistended loops of small bowel and opacification of loops of ileum was seen within 15 minutes.  Subsequent transit of contrast was rather slow and opacification of the colon was seen 2 hours into study.  Spot images of the small bowel were performed which is are without significant persistent stricture or abnormal dilatation.  Impression: 1. Dilated esophagus    2. Gastroesophageal reflux disease.    3. Status post gastric sleeve.    Electronically signed by: Farhad Turner  Date:    2023  Time:    16:06      Review of Systems:  12 point ROS negative except as stated in HPI    PAST HISTORY:     Past Medical History:   Diagnosis Date    Chronic back pain     Depression     GERD (gastroesophageal reflux disease)     History of stomach ulcers     Hypertension     IBS (irritable bowel syndrome)     Irregular heart beat     Ringling grade D esophagitis     Sleep apnea, unspecified      Past Surgical History:   Procedure Laterality Date     SECTION      CHOLECYSTECTOMY  10/03/2012    COLONOSCOPY      ESOPHAGOGASTRODUODENOSCOPY      LAPAROSCOPIC SLEEVE GASTRECTOMY  2021    Dr. Higginbotham     Family History   Problem Relation Age of Onset    Hypertension Maternal Grandmother      Social History     Socioeconomic History    Marital status:    Tobacco Use    Smoking status: Never    Smokeless tobacco: Never   Substance and Sexual Activity    Alcohol use: Not Currently  "   Drug use: Never       MEDICATIONS & ALLERGIES:     Current Outpatient Medications on File Prior to Visit   Medication Sig    buPROPion (WELLBUTRIN XL) 150 MG TB24 tablet Take 150 mg by mouth every morning.    DULoxetine (CYMBALTA) 60 MG capsule Take 60 mg by mouth 2 (two) times daily.    esomeprazole (NEXIUM) 40 MG capsule Take 40 mg by mouth 2 (two) times daily.    famotidine (PEPCID) 40 MG tablet Take 40 mg by mouth every evening.    LORazepam (ATIVAN) 0.5 MG tablet Take 0.5-1 mg by mouth.    montelukast (SINGULAIR) 10 mg tablet Take 10 mg by mouth.    MOUNJARO 5 mg/0.5 mL PnIj Inject 0.5 mg into the skin every 7 days.    sucralfate (CARAFATE) 1 gram tablet     traZODone (DESYREL) 100 MG tablet Take 100 mg by mouth every evening.     No current facility-administered medications on file prior to visit.     Review of patient's allergies indicates:  No Known Allergies    OBJECTIVE:     Vitals:    10/10/23 1539   BP: (!) 131/92   Pulse: 79   Weight: 88.5 kg (195 lb)   Height: 5' 5" (1.651 m)     Body mass index is 32.45 kg/m².    Physical Exam:  General:  Well developed, well nourished, no acute distress  HEENT:  Normocephalic, atraumatic, PERRL, EOMI, clear sclera, neck supple  CVS:  RRR, S1 and S2 normal, no murmurs, rubs, gallops  Resp:  Lungs clear to auscultation, no wheezes, rales, rhonchi, cough  GI:  Abdomen soft, non-tender, non-distended, normoactive bowel sounds, no masses  :   Deferred  MSK:  No muscle atrophy, cyanosis, peripheral edema, full range of motion  Skin:  No rashes, ulcers, erythema  Neuro:  CNII-XII grossly intact  Psych:  Alert and oriented to person, place, and time    Results:  I have independently reviewed all pertinent lab and radiologic studies relevant to general surgery.      VISIT DIAGNOSES:   No diagnosis found.    ASSESSMENT/PLAN:     Plan: Laproscopic Gastrojejunostomy    - Order esophagram, esophageal manometry/motility study, EGD if not done within 12 months.     - " Anahy discussed surgical options for anti-reflux procedure. Complete workup listed above then plan to have pt return to clinic to discuss surgery more in detail. Dr. Higginbotham discussed non-surgical options (continued medical mgmt, dietary/lifestyle changes) vs. surgical intervention (Laparoscopic Nissen fundoplication vs. Toupet fundoplication (if decreased motility)vs. Shanthi Nissen gastroplasty), Laparoscopic repair of hiatal hernia, and magnetic sphincter augmentation Linx procedure.         - Dr. Higginbotham examined patient, discussed recommendations, obtained informed consent, and answered all questions with patient/family.     - Counseling included differential diagnoses, treatment options, risks and benefits, lifestyle changes, prognosis, and medications.    The patient was interactive, and verbalized understanding.    Ted Higginbotham MD

## 2023-10-11 ENCOUNTER — TELEPHONE (OUTPATIENT)
Dept: SURGERY | Facility: CLINIC | Age: 38
End: 2023-10-11
Payer: COMMERCIAL

## 2023-10-11 DIAGNOSIS — K21.9 GASTROESOPHAGEAL REFLUX DISEASE, UNSPECIFIED WHETHER ESOPHAGITIS PRESENT: Primary | ICD-10-CM

## 2023-10-11 DIAGNOSIS — K31.89 RETAINED FOOD IN STOMACH: ICD-10-CM

## 2023-10-11 DIAGNOSIS — R63.5 WEIGHT GAIN: ICD-10-CM

## 2023-10-13 LAB
ALBUMIN SERPL-MCNC: 4.9 G/DL (ref 3.9–4.9)
ALBUMIN/GLOB SERPL: 2 {RATIO} (ref 1.2–2.2)
ALP SERPL-CCNC: 54 IU/L (ref 44–121)
ALT SERPL-CCNC: 11 IU/L (ref 0–32)
AST SERPL-CCNC: 13 IU/L (ref 0–40)
BASOPHILS # BLD AUTO: 0.2 X10E3/UL (ref 0–0.2)
BASOPHILS NFR BLD AUTO: 2 %
BILIRUB SERPL-MCNC: 0.4 MG/DL (ref 0–1.2)
BUN SERPL-MCNC: 10 MG/DL (ref 6–20)
BUN/CREAT SERPL: 13 (ref 9–23)
CALCIUM SERPL-MCNC: 9.8 MG/DL (ref 8.7–10.2)
CHLORIDE SERPL-SCNC: 104 MMOL/L (ref 96–106)
CO2 SERPL-SCNC: 23 MMOL/L (ref 20–29)
CREAT SERPL-MCNC: 0.8 MG/DL (ref 0.57–1)
EOSINOPHIL # BLD AUTO: 0.2 X10E3/UL (ref 0–0.4)
EOSINOPHIL NFR BLD AUTO: 2 %
ERYTHROCYTE [DISTWIDTH] IN BLOOD BY AUTOMATED COUNT: 12.4 % (ref 11.7–15.4)
EST. GFR  (NO RACE VARIABLE): 98 ML/MIN/1.73
GLOBULIN SER CALC-MCNC: 2.4 G/DL (ref 1.5–4.5)
GLUCOSE SERPL-MCNC: 88 MG/DL (ref 70–99)
HCT VFR BLD AUTO: 42.8 % (ref 34–46.6)
HGB BLD-MCNC: 14.7 G/DL (ref 11.1–15.9)
IMM GRANULOCYTES NFR BLD AUTO: 1 %
IRON SATN MFR SERPL: 43 % (ref 15–55)
IRON SERPL-MCNC: 123 UG/DL (ref 27–159)
LYMPHOCYTES # BLD AUTO: 2 X10E3/UL (ref 0.7–3.1)
LYMPHOCYTES NFR BLD AUTO: 27 %
MCH RBC QN AUTO: 30.8 PG (ref 26.6–33)
MCHC RBC AUTO-ENTMCNC: 34.3 G/DL (ref 31.5–35.7)
MCV RBC AUTO: 90 FL (ref 79–97)
MONOCYTES # BLD AUTO: 0.4 X10E3/UL (ref 0.1–0.9)
MONOCYTES NFR BLD AUTO: 5 %
NEUTROPHILS # BLD AUTO: 4.5 X10E3/UL (ref 1.4–7)
NEUTROPHILS NFR BLD AUTO: 63 %
PLATELET # BLD AUTO: 336 X10E3/UL (ref 150–450)
POTASSIUM SERPL-SCNC: 3.9 MMOL/L (ref 3.5–5.2)
PROT SERPL-MCNC: 7.3 G/DL (ref 6–8.5)
RBC # BLD AUTO: 4.78 X10E6/UL (ref 3.77–5.28)
SODIUM SERPL-SCNC: 141 MMOL/L (ref 134–144)
SPECIMEN STATUS REPORT: NORMAL
TIBC SERPL-MCNC: 289 UG/DL (ref 250–450)
UIBC SERPL-MCNC: 166 UG/DL (ref 131–425)
VIT B1 BLD-SCNC: 116.1 NMOL/L (ref 66.5–200)
VIT B12 SERPL-MCNC: 1354 PG/ML (ref 232–1245)
WBC # BLD AUTO: 7.2 X10E3/UL (ref 3.4–10.8)

## 2023-11-07 ENCOUNTER — TELEPHONE (OUTPATIENT)
Dept: SURGERY | Facility: HOSPITAL | Age: 38
End: 2023-11-07
Payer: COMMERCIAL

## 2023-11-07 NOTE — TELEPHONE ENCOUNTER
----- Message from Shelby Rodriguez MA sent at 11/7/2023 10:16 AM CST -----  Regarding: RE: Voicemail  Gastric Emptying was scheduled. Pt no showed.   Manometry scheduled 11/16/23  ----- Message -----  From: Violette Estrella MA  Sent: 11/6/2023   4:25 PM CST  To: MARY Guadarrama; Lisette Castelan RN; #  Subject: Voicemail                                        Pt called Lm stating that she is having pain under her breast near her ribs, radiating to her back.she feels it on both sides of her ribs. Lap antonio done 10/3/12- pt stated that it was  who done the surgery , pt denies vomiting but does have nausea.  PCP  told her to go to an urgent care but she was wondering if we need to order testing.   She did google the symptoms and thinks that this could be related taking Mounjaro, this has been going on for a few months now, please advise.

## 2023-11-07 NOTE — TELEPHONE ENCOUNTER
----- Message from Violette Estrella MA sent at 11/6/2023  4:06 PM CST -----  Regarding: Voicemail  Pt called Lm stating that she is having pain under her breast near her ribs, radiating to her back.she feels it on both sides of her ribs. Lap antonio done 10/3/12- pt stated that it was  who done the surgery , pt denies vomiting but does have nausea.  PCP  told her to go to an urgent care but she was wondering if we need to order testing.   She did google the symptoms and thinks that this could be related taking Mounjaro, this has been going on for a few months now, please advise.

## 2023-11-08 NOTE — TELEPHONE ENCOUNTER
I spoke with pt she RS Gastric emptying study to 11/14/23, Manometry scheduled for 11/16/23, she did stop the mounjaro about a week about, its now more of a discomfort rather then a hurting pain, Informed the pt that we will follow up on the test results and go from there at this time she can get on a low fat diet at this time and see If that helps with the discomfort, placed pt on a reminder for testing and ck status of how she is feeling.

## 2023-11-17 ENCOUNTER — TELEPHONE (OUTPATIENT)
Dept: SURGERY | Facility: CLINIC | Age: 38
End: 2023-11-17
Payer: COMMERCIAL

## 2023-11-17 NOTE — TELEPHONE ENCOUNTER
Discussed results with pt. Manometry completed yesterday. Results pending.  Plan to bring pt back in to discuss POC once we receive results. Pt in agreement

## 2023-11-17 NOTE — TELEPHONE ENCOUNTER
----- Message from MARY Guadarrama sent at 11/16/2023  3:28 PM CST -----  Yes. GE study normal.    Proceed with motility study.     Then EGD if not recently done.   ----- Message -----  From: Shelby Rodriguez MA  Sent: 11/15/2023   1:36 PM CST  To: MARY Guadarrama; Lisette Castelan RN; #    Gastric emptying study results. Normal. Please advise.   Motility scheduled 11/16/2023

## 2023-11-30 ENCOUNTER — OFFICE VISIT (OUTPATIENT)
Dept: SURGERY | Facility: CLINIC | Age: 38
End: 2023-11-30
Payer: COMMERCIAL

## 2023-11-30 VITALS
HEART RATE: 64 BPM | DIASTOLIC BLOOD PRESSURE: 84 MMHG | BODY MASS INDEX: 33.32 KG/M2 | HEIGHT: 65 IN | SYSTOLIC BLOOD PRESSURE: 124 MMHG | WEIGHT: 200 LBS

## 2023-11-30 DIAGNOSIS — Z90.3 HISTORY OF SLEEVE GASTRECTOMY: ICD-10-CM

## 2023-11-30 DIAGNOSIS — K22.4 ESOPHAGEAL DYSMOTILITY: ICD-10-CM

## 2023-11-30 DIAGNOSIS — K21.00 GASTROESOPHAGEAL REFLUX DISEASE WITH ESOPHAGITIS WITHOUT HEMORRHAGE: Primary | ICD-10-CM

## 2023-11-30 DIAGNOSIS — K44.9 HIATAL HERNIA: ICD-10-CM

## 2023-11-30 PROCEDURE — 3074F SYST BP LT 130 MM HG: CPT | Mod: CPTII,,, | Performed by: NURSE PRACTITIONER

## 2023-11-30 PROCEDURE — 3074F PR MOST RECENT SYSTOLIC BLOOD PRESSURE < 130 MM HG: ICD-10-PCS | Mod: CPTII,,, | Performed by: NURSE PRACTITIONER

## 2023-11-30 PROCEDURE — 3079F DIAST BP 80-89 MM HG: CPT | Mod: CPTII,,, | Performed by: NURSE PRACTITIONER

## 2023-11-30 PROCEDURE — 99214 OFFICE O/P EST MOD 30 MIN: CPT | Mod: ,,, | Performed by: NURSE PRACTITIONER

## 2023-11-30 PROCEDURE — 3008F PR BODY MASS INDEX (BMI) DOCUMENTED: ICD-10-PCS | Mod: CPTII,,, | Performed by: NURSE PRACTITIONER

## 2023-11-30 PROCEDURE — 3008F BODY MASS INDEX DOCD: CPT | Mod: CPTII,,, | Performed by: NURSE PRACTITIONER

## 2023-11-30 PROCEDURE — 3079F PR MOST RECENT DIASTOLIC BLOOD PRESSURE 80-89 MM HG: ICD-10-PCS | Mod: CPTII,,, | Performed by: NURSE PRACTITIONER

## 2023-11-30 PROCEDURE — 1159F MED LIST DOCD IN RCRD: CPT | Mod: CPTII,,, | Performed by: NURSE PRACTITIONER

## 2023-11-30 PROCEDURE — 99214 PR OFFICE/OUTPT VISIT, EST, LEVL IV, 30-39 MIN: ICD-10-PCS | Mod: ,,, | Performed by: NURSE PRACTITIONER

## 2023-11-30 PROCEDURE — 1159F PR MEDICATION LIST DOCUMENTED IN MEDICAL RECORD: ICD-10-PCS | Mod: CPTII,,, | Performed by: NURSE PRACTITIONER

## 2023-11-30 RX ORDER — PANTOPRAZOLE SODIUM 40 MG/1
40 TABLET, DELAYED RELEASE ORAL DAILY
Status: ON HOLD | COMMUNITY
End: 2024-02-28

## 2023-11-30 RX ORDER — VORTIOXETINE 20 MG/1
1 TABLET, FILM COATED ORAL NIGHTLY
Status: ON HOLD | COMMUNITY
Start: 2023-10-31 | End: 2024-02-26

## 2023-11-30 NOTE — PROGRESS NOTES
[facility] - General Surgery Services  [address] [place] [id number]  Phone: [phone]  Fax: [phone]     HISTORY & PHYSICAL  General Surgery  Dr. Ted Higginbotham      Patient Name: Lisa Celestin  YOB: 1985  Author: Hailey Arshad, ANP     Date: 11/30/2023                   SUBJECTIVE:     Chief Complaint/Reason for Admission:   Chief Complaint   Patient presents with    Abdominal Pain     Abdominal pain       History of Present Illness:  Ms. Lisa Celestin is a 38 y.o. female who presents to clinic for surgical evaluation of intractable reflux. Pt c/o nocturnal reflux worsening since last appt. Symptoms are severe. Wakes her up at night and she is not sleeping well. She has attempted dietary changes, multiple GI medications, elevating head of bed at night without success.   She is currently taking Mounjaro to assist with further weight loss post sleeve gastrectomy. She attempted discontinuing Mounjaro to see if reflux symptoms would improve but they did not.       Previous history of Lap sleeve gastrectomy 2/1/2021 with Dr. Higginbotham.  She states she had reflux and heartburn symptoms prior to sleeve gastrectomy.     Pre op weight 248 #  2 weeks post op 231 #  2 months post op 211 #  6 months post op 186 # BMI 30.95  2.5 years post op 200 #  BMI 33.28    + sodas, [employer] energy.  + mixing solids/liquids    Positive symptoms:   Heartburn, Regurgitation, Dyspepsia, Upper Abdominal Pain, Nausea, and Vomiting     Previous treatments:   PPI, H2 Antagonist, OTC Antacids, and Rx Carafate    Referring provider: No ref. provider found   Patient Care Team:  Jevon Escobar MD as PCP - General (Family Medicine)  Ted Higginbotham MD as Surgeon (Bariatrics)     Pertinent workup:    UGI severe reflux, dilated esophagus  EGD Grade D esophagitis, HH,  gastritis.  Manometry ineffective motility, weak peristalsis, 5 failed swallows  GE study normal.    FL  Upper GI With Small Bowel (xpd)  Narrative: EXAMINATION:  FL UPPER GI WITH SMALL BOWEL (XPD)    CLINICAL HISTORY:  Diaphragmatic hernia without obstruction or gangrene    TECHNIQUE:   images were obtained of the abdomen.  Patient performed several barium swallows for this exam multiple fluoroscopic images were obtained of the esophagus and the stomach.  Additional oral contrast was given and serial abdomen images acquired.    Fluoroscopy time 1 minutes 22 seconds    Radiation dose 267 mGy    Cumulative DAP 11.06 uGym2    Total of 98 fluoroscopic images were acquired    COMPARISON:  None available    FINDINGS:   images of the abdomen show bilateral upper abdomen metallic clips.  Bowel gas pattern is nonspecific nonobstructive.  No apparent organomegaly.    Timing and emptying of barium from the hypopharynx is unremarkable.  Esophageal unremarkable primary and secondary peristalsis and there were no tertiary waveforms.  There is diffuse mild dilatation of the esophagus without stricture and there is no diverticulum.  There was gastroesophageal reflux during the exam.  There is no significant hiatal hernia.  There was no aspiration or regurgitation.    Stomach is of small volume related to gastric sleeve.  There is no evidence of gastric sleeve migration above the level of the gastroesophageal junction.  No neofundus identified.  Contrast promptly transited from the stomach into the duodenum.  Duodenum shows unremarkable distensibility and folds.  There was initial rapid transit of contrast through nondistended loops of small bowel and opacification of loops of ileum was seen within 15 minutes.  Subsequent transit of contrast was rather slow and opacification of the colon was seen 2 hours into study.  Spot images of the small bowel were performed which is are without significant persistent stricture or abnormal dilatation.  Impression: 1. Dilated esophagus    2. Gastroesophageal reflux disease.    3. Status  post gastric sleeve.    Electronically signed by: Farhad Turner  Date:    2023  Time:    16:06      Review of Systems:  12 point ROS negative except as stated in HPI    PAST HISTORY:     Past Medical History:   Diagnosis Date    Chronic back pain     Depression     GERD (gastroesophageal reflux disease)     History of stomach ulcers     Hypertension     IBS (irritable bowel syndrome)     Irregular heart beat     Steger grade D esophagitis     Sleep apnea, unspecified      Past Surgical History:   Procedure Laterality Date     SECTION      CHOLECYSTECTOMY  10/03/2012    COLONOSCOPY      ESOPHAGOGASTRODUODENOSCOPY      LAPAROSCOPIC SLEEVE GASTRECTOMY  2021    Dr. Higginbotham     Family History   Problem Relation Age of Onset    Hypertension Maternal Grandmother      Social History     Socioeconomic History    Marital status:    Tobacco Use    Smoking status: Never    Smokeless tobacco: Never   Substance and Sexual Activity    Alcohol use: Not Currently    Drug use: Never       MEDICATIONS & ALLERGIES:     Current Outpatient Medications on File Prior to Visit   Medication Sig    buPROPion (WELLBUTRIN XL) 150 MG TB24 tablet Take 150 mg by mouth every morning.    DULoxetine (CYMBALTA) 60 MG capsule Take 60 mg by mouth 2 (two) times daily.    esomeprazole (NEXIUM) 40 MG capsule Take 40 mg by mouth 2 (two) times daily.    famotidine (PEPCID) 40 MG tablet Take 40 mg by mouth every evening.    LORazepam (ATIVAN) 0.5 MG tablet Take 0.5-1 mg by mouth.    montelukast (SINGULAIR) 10 mg tablet Take 10 mg by mouth.    MOUNJARO 5 mg/0.5 mL PnIj Inject 0.5 mg into the skin every 7 days.    sucralfate (CARAFATE) 1 gram tablet     traZODone (DESYREL) 100 MG tablet Take 100 mg by mouth every evening.     No current facility-administered medications on file prior to visit.     Review of patient's allergies indicates:  No Known Allergies    OBJECTIVE:     Vitals:    23 1322   BP: 124/84   Pulse: 64  "  Weight: 90.7 kg (200 lb)   Height: 5' 5" (1.651 m)     Body mass index is 33.28 kg/m².    Physical Exam:  General:  Well developed, well nourished, no acute distress  HEENT:  Normocephalic, atraumatic, PERRL, EOMI, clear sclera, neck supple  CVS:  RRR, S1 and S2 normal, no murmurs, rubs, gallops  Resp:  Lungs clear to auscultation, no wheezes, rales, rhonchi, cough  GI:  Abdomen soft, non-tender, non-distended, normoactive bowel sounds, no masses  :   Deferred  MSK:  No muscle atrophy, cyanosis, peripheral edema, full range of motion  Skin:  No rashes, ulcers, erythema  Neuro:  CNII-XII grossly intact  Psych:  Alert and oriented to person, place, and time    Results:  I have independently reviewed all pertinent lab and radiologic studies relevant to general surgery.      VISIT DIAGNOSES:       ICD-10-CM ICD-9-CM   1. Gastroesophageal reflux disease with esophagitis without hemorrhage  K21.00 530.81     530.10   2. History of sleeve gastrectomy  Z90.3 V15.29   3. Hiatal hernia  K44.9 553.3   4. Esophageal dysmotility  K22.4 530.5       ASSESSMENT/PLAN:     Plan: Pt does not qualify for standard anti-reflux surgery of fundoplication due to absence of fundus post sleeve gastrectomy, pt does not qualify for magnetic sphincter augmentation LINX procedure due to ineffective motility.   Recommend diversion procedure : convert to GJ bypass.    Recommend laparoscopic repair of hiatal hernia, laparoscopic gastrojejunostomy with Jerrica-en-Y reconstruction. Her esophagitis has progressively worsened and I am concerned about progression to Cabrera's esophagus, dysplasia, metaplasia, or esophageal malignancy if left untreated.     She has failed conservative measures including dietary and medication. Recommend surgical intervention to stop the progression of reflux damage.     Submit clinicals to insurance for approval.    Recommend elimination of carbonated beverages. Discontinuation of Mounjaro. Avoid mixing solids/liquids. " Change to liquid diet in the evening.     Provided patient with back on track education to encourage more weight loss after sleeve gastrectomy.     Continue annual bariatric FU with lab checks.         - Examined patient, discussed recommendations,  and answered all questions with patient/family.     - Counseling included differential diagnoses, treatment options, risks and benefits, lifestyle changes, prognosis, and medications.    The patient was interactive, and verbalized understanding.    Hailey Arshad, ANP

## 2023-11-30 NOTE — PATIENT INSTRUCTIONS
Getting Back on Track  Why am I not losing lesli?  Ask yourself:   Have I eaten at least 60-80 grams of protein daily?  Did I drink my 64 ounces or more of non-caloric, non-carbonated fluids a day?   Did I stop drinking 30 minutes before, during, or 30 minutes after I eat?  Did I choose high fiber foods like vegetables or beans?  Have I taken all my vitamins and mineral supplements?  Do I usually get a good night's sleep?  Am I eating sugary foods that can cause cravings?  Am I hungry or am I craving something? How do I tell the difference?    Difference between Craving and Hunger  Cravings  Comes on quickly.  Desire for a specific food or taste such as sweet, salty, or crunchy, etc.  You continue to eat even if full or after feeling full.  May come with regret with your choices afterwards because you do not feel satisfied.  Hunger  Comes on slowly.  If you are truly hungry, any food capable of filling you up will do.  Example: egg, baked chicken, broccoli, beans, broth-based soup.  Once you are full, you stop eating.  No regret with filling a very basic human need.   Feelings & sounds of air & emptiness.    What do I do about it?  Eat more lean protein at least 60-80 grams daily.    Eat more fiber.  Fiber is found in vegetables, fruit, beans, nuts, and whole grains.  Take small bites.  Eat slowly & savor your food.  Drink at least 64 ounces of water daily.  May be flavored with calorie free flavoring like Crystal Lite or naturally with fruit.  Take your 2 multivitamins, 3 calcium citrates with vitamin D, & B12.   Find an exercise you enjoy & increase as tolerated  Get 8 hours of sleep a night.  Monitor your weight weekly to monthly as a check in with yourself.  Manage your stress as best you can in constructive ways.  Keep a food journal.   Have a meal schedule. Eat at least three meals or balanced snacks daily.  Plan healthy, balanced meals.    Find or make healthier versions of your favorite foods.  Know your  food traps.     GETTING BACK TO THE BASICS  *repeat 3-5 times per day  WATER  Sip water 4 ounces an hour or more if tolerated.    When: 30 minutes after mini-meal or snack through 30 minutes before next mini-meal or snack.   PROTEIN  Eat up to 2 ounces, 2 tablespoons up to ¼ cup protein or drink protein shake.  VEGETABLES  Eat non-starchy vegetables until you are a 6-8 on a 1-10 fullness scale OR eat only until you can still feel you can go for a walk after you eat. No corn, peas, or potatoes.  WAIT  Do not drink for 30 minutes before, during, or after eating a mini-meal or snack.    Focus on filling food. Drink your fluids & at the right time!  High protein, fiber, & good fats, are the only types of food that can help you to feel full.  Simple carbohydrates, by definition, cannot help you to feel full.   A research study found that giving people an amino acid supplement (protein building blocks) when on a liquid diet for the study that causes some malnutrition decreased binge eating by 66 % & reduced food cravings by 70 %. Those taking the amino acid supplement only had 14% weight regain compared to 47% weight regain without the amino acid supplement.  Take smaller bites & eat slower. This gives your stomach time to signal your brain is full. Typically, it takes 20 minutes.  We ask your meals to take 30 minutes.   Drinking calorie free fluids and eating high fluid foods like broth-based soups have been proven to help you to feel chavez.   The reason why we ask you not to drink for 30 minutes to 1 hour after meals is that the fluids push food through the pouch too quickly which can make you feel hungry faster & causing dumping syndrome.  Focus on Healthy Habits  Vitamin & minerals help with long-term weight maintenance through their role in regulating appetite, hunger, nutrient absorption, metabolic rate, fat & sugar metabolism, thyroid & adrenal function, energy storage, & glucose homeostasis.  Exercise can burn  calories, increase lean muscle mass so you burn more calories, & reduce stress which will reduce cravings & emotional eating.  Monitor your weight weekly or monthly. Weight monitoring allows you to set a set point weight (a weight to not go above). Reaching the set point weight signals you to revert to all healthy choices to prevent the weight regain.  These stops slipping into old habits in its tracks.  When choosing a set point weight, remember Dr. Wilkes says a 10 lbs. weight fluctuation is normal.  Sleep  Lack of sleep increases Ghrelin the hunger hormone & decreases Leptin the fullness hormone.   Lack of sleep also increases another lipid in blood (endocannabinoid). It makes eating more enjoyable especially in the evening time.  It increases cravings for simple carbohydrates & high fat foods. People who don't get enough sleep eat twice as much fat and more than 300 extra calories the next day compared with those who sleep for eight hours.    Stress  Stress increases your fight or flight survival mode.  Think of it as your body thinks you are running from a bear. Cortisol a hormone increases to tell your body to eat more so you have energy to outrun the bear.  Increased levels of Cortisol cause increased insulin levels.  Increased insulin lowers blood sugars which causes you to eat simple carbohydrates to bring your blood sugar back up quickly.   Ideas to manage stress:  Spend time on a hobby you were once too busy for or find a new hobby.  Go for a walk or ride a bike. Exercise increases happy hormones & Serotonin, manages anxiety.  Find mindful breathing, yoga, & meditations on YouTube.    A common breathing exercise is to inhale counting to 4, hold, & exhale counting to four. Repeat for one minute.   New to yoga beginner yoga & chair yoga    Food Journal: WRITE  What & how much did you eat?  What & how much you drink throughout the day?  How were you feeling at that time?  Ex: hangry, anxious, sad,  blah?  Are you hungry right now?  Extra points: fullness scale from 1-10 afterwards. Aim for 5-8.  Ask yourself could I exercise comfortably or do I feel too full to exercise now.  If the answer is you feel too full, you have eaten too much.   Learn your food traps, habits, & what to work on to reach your goals.    Plan & Schedule Meals  Having a meal schedule help you to prevent grazing & reaching for convenience foods.   Get to know yourself.  Do you like breakfast or something fast to grab?  3-5 small balanced snacks throughout the day or 3 meals   Plan for healthy, balanced meals.  food prepping   Make a menu for the next few days    FILL up SMARTLY  High protein, fiber, & good fats, are the only types of food that can help you to feel full.  Simple carbohydrates, by definition, cannot help you to feel full.   A research study found that giving people an amino acid supplement (protein building blocks) when on a liquid diet for the study that causes some malnutrition decreased binge eating by 66 % & reduced food cravings by 70 %. Those taking the amino acid supplement only had 14% weight regain compared to 47% weight regain without the amino acid supplement.  Take smaller bites & eat slower. This gives your stomach time to signal your brain is full. Typically, it takes 20 minutes.  We ask you spend 30 minutes eating a meal.  Quick Meal to not go for fast food:  You can sauté a pound of lean ground turkey with onion & season separately for multiple meals.  Lettuce wrap taco  Ground turkey chili  Prepare zucchini noodles or boil chickpea pasta, combine with a no sugar added tomato sauce like Nam Olea's Sockaroni sauce.   Burrito or taco bowl  Soups & more  Black bean salsa soup  Vegetable soup made with a frozen vegetable mix  Vegetarian chili  Freeze seasons like chopped onion, celery, and green bell pepper to prepare recipes faster.   Sauté chicken strips & season separately   Chicken fajita bowl: sautéed  chicken in fajita seasonings, frozen bell pepper & onion strips.  Chicken salad with avocado oil or olive oil mayonnaise  White chicken chili  Chicken for green salads  Chicken lettuce wrap  DON'T say NO all the time  Always saying no to a food makes you focus on them more.  When you have a craving, sip on some water & wait 20 minutes.  When you delay a craving, many times you do not want it anymore.     Healthier versions of favorite foods prevent the feeling of being deprived of them.   After surgery there are some foods that we cannot have due to dumping syndrome, but a healthier version would not cause dumping.  This will help you feel your way of eating is sustainable, so you keep on the right track longer.     Healthy Swaps  Instead of that Swap for This   Bread Cauliflower Thin  [place] Flour tortilla  [place]repini Egg white wrap  Iker's Flax Seed, Oat Bran, and Whole Wheat Lavish bread  [place] Oat Bran and Whole Wheat tortilla  Whole wheat or Whole Wheat Carb Balance 6 inch tortilla  Note: some people must toast tortillas to tolerate.     Rice  Cauliflower rice  Homemade cauliflower rice- pulsed in    Bread crumbs [place] flour  Parmesan cheese  In a pinch pork rinds  Note: egg needed as binder   Instead of that Swap for This   Bread Cauliflower Thin  [place] Flour tortilla  [place]ini Egg white wrap  Iker's Flax Seed, Oat Bran, and Whole Wheat Lavish bread  [place] Oat Bran and Whole Wheat tortilla  Whole wheat or Whole Wheat Carb Balance 6-inch tortilla  Note: some people must toast tortillas to tolerate.     Rice  Cauliflower rice  Homemade cauliflower rice- pulsed in

## 2023-12-14 DIAGNOSIS — K21.9 GASTROESOPHAGEAL REFLUX DISEASE, UNSPECIFIED WHETHER ESOPHAGITIS PRESENT: Primary | ICD-10-CM

## 2024-01-18 ENCOUNTER — TELEPHONE (OUTPATIENT)
Dept: BARIATRICS | Facility: HOSPITAL | Age: 39
End: 2024-01-18
Payer: COMMERCIAL

## 2024-01-23 ENCOUNTER — CLINICAL SUPPORT (OUTPATIENT)
Dept: BARIATRICS | Facility: HOSPITAL | Age: 39
End: 2024-01-23

## 2024-01-23 DIAGNOSIS — K21.9 GASTROESOPHAGEAL REFLUX DISEASE, UNSPECIFIED WHETHER ESOPHAGITIS PRESENT: Primary | ICD-10-CM

## 2024-01-23 DIAGNOSIS — E66.9 OBESITY: Primary | ICD-10-CM

## 2024-01-23 DIAGNOSIS — Z98.84 HISTORY OF BARIATRIC SURGERY: Primary | ICD-10-CM

## 2024-01-23 NOTE — PROGRESS NOTES
"  Date of education: 1/23/24  Pt education type: [x]Pre op  []Post op  Surgery date:   Type of surgery: revisional procedure     Education was provided on:   []Importance of protein and vitamin protocol  []Importance of drinking  fl oz/day of non carbonated sugar free non caffeinated beverages  []Importance of following dietary protocol  []Importance of early ambulation postop   []Use of incentive spirometer 10 times an hour while awake  []Non opiod pain management post op   []Discontinuing use of meds containing aspirin, ibuprofen, NSAIDs, post op  []Signs and symptoms of immediate and long term complications post-op  []Prevention and signs and symptoms of blood clots   []Prevention and signs of infection  []Reviewed medication regimen  [x]Importance of adhering to behavioral changes  [x]Importance of following exercise protocol      Barriers to learning:  [x]None evident  []Acuity of illness  []Cognitive defects  []Cultural barriers  []Desire/Motivation  []Difficulty concentrating  []Emotional state  []Financial concerns  []Hearing deficit  []Language barrier  []Literacy  []Memory problems  []Vision impairment     Teaching methods:  []Demonstration  [x]Explanation  [x]Printed materials  []Teach back  []Virtual/web based    Expected Compliance:  [x]Good  []Fair  []Poor    Additional Notes:  A body composition was conducted and patient was educated on results. Patient states that she feels like she is always stressed. She does admit that she stress eats and grazes. She reports that she does have an "all or nothing" mentality. She is diagnosed with anxiety and ADD. She feels overwelmed when she has a lot on her plate and tends to give up. She is not currently taking ADD (Vivance) meds because the pharmacy she fills her medication is out. She is going to attempt to have her prescribing doctor to fax it to a pharmacist that has it in stock in hopes of getting back on meds to help her focus on accomplishing task " and not feeling overwhelmed.     MEDHAT Meeks, CPT, CHC

## 2024-01-23 NOTE — PROGRESS NOTES
Date of education: 1/23/24  Pt education type: [x]Pre op  []Post op  Surgery date: 2/26/23  Type of surgery: revisional procedure     Education was provided on:   [x]Importance of protein and vitamin protocol  [x]Importance of drinking  fl oz/day of non carbonated sugar free non caffeinated beverages  [x]Importance of following dietary protocol  []Importance of early ambulation postop   []Use of incentive spirometer 10 times an hour while awake  []Non opiod pain management post op   []Discontinuing use of meds containing aspirin, ibuprofen, NSAIDs, post op  []Signs and symptoms of immediate and long term complications post-op  []Prevention and signs and symptoms of blood clots   []Prevention and signs of infection  []Reviewed medication regimen  []Importance of adhering to behavioral changes  []Importance of following exercise protocol      Barriers to learning:  [x]None evident  []Acuity of illness  []Cognitive defects  []Cultural barriers  []Desire/Motivation  []Difficulty concentrating  []Emotional state  []Financial concerns  []Hearing deficit  []Language barrier  []Literacy  []Memory problems  []Vision impairment     Teaching methods:  []Demonstration  [x]Explanation  [x]Printed materials  []Teach back  []Virtual/web based    Expected Compliance:  [x]Good  []Fair  []Poor    Additional Notes:

## 2024-01-23 NOTE — PROGRESS NOTES
Date of education: 1/23/24  Pt education type: [x]Pre op  []Post op  Surgery date: 2/26/24  Type of surgery: revisional procedure- Conversion from VSG to RNY     Education was provided on:   [x]Importance of protein and vitamin protocol  [x]Importance of drinking  fl oz/day of non carbonated sugar free non caffeinated beverages  [x]Importance of following dietary protocol  [x]Importance of early ambulation postop   [x]Use of incentive spirometer 10 times an hour while awake  [x]Non opiod pain management post op   [x]Discontinuing use of meds containing aspirin, ibuprofen, NSAIDs, post op  [x]Signs and symptoms of immediate and long term complications post-op  [x]Prevention and signs and symptoms of blood clots   [x]Prevention and signs of infection  [x]Reviewed medication regimen  [x]Importance of adhering to behavioral changes  [x]Importance of following exercise protocol      Barriers to learning:  [x]None evident  []Acuity of illness  []Cognitive defects  []Cultural barriers  []Desire/Motivation  []Difficulty concentrating  []Emotional state  []Financial concerns  []Hearing deficit  []Language barrier  []Literacy  []Memory problems  []Vision impairment     Teaching methods:  []Demonstration  [x]Explanation  []Printed materials  [x]Teach back  []Virtual/web based    Expected Compliance:  [x]Good  []Fair  []Poor    Additional Notes:  Reviewed above pre-op protocols with patient. She verbalized understanding. Reiterated the importance of eliminating the energy and carbonated drinks from her diet starting immediately as well as eliminating acidic and fried foods. Encouraged to eat a bland diet until she was instructed on the pre-op diet.

## 2024-02-08 ENCOUNTER — LAB VISIT (OUTPATIENT)
Dept: LAB | Facility: HOSPITAL | Age: 39
End: 2024-02-08
Attending: SURGERY
Payer: COMMERCIAL

## 2024-02-08 ENCOUNTER — OFFICE VISIT (OUTPATIENT)
Dept: SURGERY | Facility: CLINIC | Age: 39
End: 2024-02-08
Payer: COMMERCIAL

## 2024-02-08 VITALS
DIASTOLIC BLOOD PRESSURE: 91 MMHG | SYSTOLIC BLOOD PRESSURE: 124 MMHG | HEIGHT: 65 IN | WEIGHT: 205 LBS | HEART RATE: 75 BPM | BODY MASS INDEX: 34.16 KG/M2

## 2024-02-08 DIAGNOSIS — K22.4 ESOPHAGEAL DYSMOTILITY: ICD-10-CM

## 2024-02-08 DIAGNOSIS — Z01.818 PRE-OPERATIVE EXAMINATION: ICD-10-CM

## 2024-02-08 DIAGNOSIS — Z90.3 HISTORY OF SLEEVE GASTRECTOMY: ICD-10-CM

## 2024-02-08 DIAGNOSIS — K44.9 HIATAL HERNIA: Primary | ICD-10-CM

## 2024-02-08 DIAGNOSIS — K21.00 GASTROESOPHAGEAL REFLUX DISEASE WITH ESOPHAGITIS WITHOUT HEMORRHAGE: ICD-10-CM

## 2024-02-08 LAB
ALBUMIN SERPL-MCNC: 4.4 G/DL (ref 3.5–5)
ALBUMIN/GLOB SERPL: 1.6 RATIO (ref 1.1–2)
ALP SERPL-CCNC: 67 UNIT/L (ref 40–150)
ALT SERPL-CCNC: 49 UNIT/L (ref 0–55)
AST SERPL-CCNC: 30 UNIT/L (ref 5–34)
BASOPHILS # BLD AUTO: 0.17 X10(3)/MCL
BASOPHILS NFR BLD AUTO: 2.3 %
BILIRUB SERPL-MCNC: 0.5 MG/DL
BUN SERPL-MCNC: 12.4 MG/DL (ref 7–18.7)
CALCIUM SERPL-MCNC: 9.5 MG/DL (ref 8.4–10.2)
CHLORIDE SERPL-SCNC: 106 MMOL/L (ref 98–107)
CO2 SERPL-SCNC: 28 MMOL/L (ref 22–29)
CREAT SERPL-MCNC: 0.69 MG/DL (ref 0.55–1.02)
EOSINOPHIL # BLD AUTO: 0.58 X10(3)/MCL (ref 0–0.9)
EOSINOPHIL NFR BLD AUTO: 7.9 %
ERYTHROCYTE [DISTWIDTH] IN BLOOD BY AUTOMATED COUNT: 12.2 % (ref 11.5–17)
GFR SERPLBLD CREATININE-BSD FMLA CKD-EPI: >60 MLS/MIN/1.73/M2
GLOBULIN SER-MCNC: 2.8 GM/DL (ref 2.4–3.5)
GLUCOSE SERPL-MCNC: 79 MG/DL (ref 74–100)
HCT VFR BLD AUTO: 44 % (ref 37–47)
HGB BLD-MCNC: 14.8 G/DL (ref 12–16)
IMM GRANULOCYTES # BLD AUTO: 0.09 X10(3)/MCL (ref 0–0.04)
IMM GRANULOCYTES NFR BLD AUTO: 1.2 %
LYMPHOCYTES # BLD AUTO: 1.66 X10(3)/MCL (ref 0.6–4.6)
LYMPHOCYTES NFR BLD AUTO: 22.7 %
MCH RBC QN AUTO: 30.3 PG (ref 27–31)
MCHC RBC AUTO-ENTMCNC: 33.6 G/DL (ref 33–36)
MCV RBC AUTO: 90.2 FL (ref 80–94)
MONOCYTES # BLD AUTO: 0.51 X10(3)/MCL (ref 0.1–1.3)
MONOCYTES NFR BLD AUTO: 7 %
NEUTROPHILS # BLD AUTO: 4.31 X10(3)/MCL (ref 2.1–9.2)
NEUTROPHILS NFR BLD AUTO: 58.9 %
NRBC BLD AUTO-RTO: 0 %
PLATELET # BLD AUTO: 286 X10(3)/MCL (ref 130–400)
PMV BLD AUTO: 10.1 FL (ref 7.4–10.4)
POTASSIUM SERPL-SCNC: 4.8 MMOL/L (ref 3.5–5.1)
PROT SERPL-MCNC: 7.2 GM/DL (ref 6.4–8.3)
RBC # BLD AUTO: 4.88 X10(6)/MCL (ref 4.2–5.4)
SODIUM SERPL-SCNC: 139 MMOL/L (ref 136–145)
WBC # SPEC AUTO: 7.32 X10(3)/MCL (ref 4.5–11.5)

## 2024-02-08 PROCEDURE — 3074F SYST BP LT 130 MM HG: CPT | Mod: CPTII,,, | Performed by: SURGERY

## 2024-02-08 PROCEDURE — 3008F BODY MASS INDEX DOCD: CPT | Mod: CPTII,,, | Performed by: SURGERY

## 2024-02-08 PROCEDURE — 3080F DIAST BP >= 90 MM HG: CPT | Mod: CPTII,,, | Performed by: SURGERY

## 2024-02-08 PROCEDURE — 36415 COLL VENOUS BLD VENIPUNCTURE: CPT

## 2024-02-08 PROCEDURE — 99214 OFFICE O/P EST MOD 30 MIN: CPT | Mod: ,,, | Performed by: SURGERY

## 2024-02-08 PROCEDURE — 1159F MED LIST DOCD IN RCRD: CPT | Mod: CPTII,,, | Performed by: SURGERY

## 2024-02-08 RX ORDER — CETIRIZINE HYDROCHLORIDE 10 MG/1
1 TABLET ORAL EVERY MORNING
Status: ON HOLD | COMMUNITY
End: 2024-02-26

## 2024-02-08 RX ORDER — LISDEXAMFETAMINE DIMESYLATE 50 MG/1
CAPSULE ORAL
Status: ON HOLD | COMMUNITY
Start: 2024-01-30 | End: 2024-02-28 | Stop reason: HOSPADM

## 2024-02-08 NOTE — PROGRESS NOTES
[facility] Surgical - General Surgery Services  [address] [place] [address]  Phone: [phone]  Fax: [phone]     HISTORY & PHYSICAL  General Surgery  Dr. Ted Higginbotham      Patient Name: Lisa Celestin  YOB: 1985  Author: Hailey Arshad, MARY     Date: 02/08/2024                   SUBJECTIVE:     Chief Complaint/Reason for Admission:   Chief Complaint   Patient presents with    Pre-op Exam     Gastro 02/26/24        History of Present Illness:  Ms. Lisa Celestin is a 38 y.o. female who presents to clinic for routine pre op visit. She is scheduled for Laparoscopic gastrojejunostomy and Laparoscopic hiatal hernia on 2/26/24 for intractable reflux.     Positive symptoms:Heartburn, Regurgitation, Dyspepsia, Upper Abdominal Pain, Nausea, and Vomiting.   nocturnal reflux,symptoms are severe. Wakes her up at night and she is not sleeping well. She has attempted dietary changes, multiple GI medications, elevating head of bed at night without success.      Previous history of Lap sleeve gastrectomy 2/1/2021 with Dr. Higginbotham.  She states she had reflux and heartburn symptoms prior to sleeve gastrectomy.      Pre op VSG weight 248 #  2 weeks post op 231 #  2 months post op 211 #  6 months post op 186 #          BMI 30.95 (Eugenio weight)  2.5 years post op 200 #          BMI 33.28    Current weight / pre op GJ bypass weight 205 # BMI 34.11     Previous treatments:   PPI, H2 Antagonist, OTC Antacids, and Rx Carafate     Referring provider: No ref. provider found   Patient Care Team:  Jevon Escobar MD as PCP - General (Family Medicine)  Ted Higginbotham MD as Surgeon (Bariatrics)      Pertinent workup:     UGI severe reflux, dilated esophagus  EGD Grade D esophagitis, HH,  gastritis.  Manometry ineffective motility, weak peristalsis, 5 failed swallows  GE study normal.     FL Upper GI With Small Bowel (xpd)  Narrative: EXAMINATION:  FL UPPER GI WITH SMALL  BOWEL (XPD)     CLINICAL HISTORY:  Diaphragmatic hernia without obstruction or gangrene     TECHNIQUE:   images were obtained of the abdomen.  Patient performed several barium swallows for this exam multiple fluoroscopic images were obtained of the esophagus and the stomach.  Additional oral contrast was given and serial abdomen images acquired.     Fluoroscopy time 1 minutes 22 seconds     Radiation dose 267 mGy     Cumulative DAP 11.06 uGym2     Total of 98 fluoroscopic images were acquired     COMPARISON:  None available     FINDINGS:   images of the abdomen show bilateral upper abdomen metallic clips.  Bowel gas pattern is nonspecific nonobstructive.  No apparent organomegaly.     Timing and emptying of barium from the hypopharynx is unremarkable.  Esophageal unremarkable primary and secondary peristalsis and there were no tertiary waveforms.  There is diffuse mild dilatation of the esophagus without stricture and there is no diverticulum.  There was gastroesophageal reflux during the exam.  There is no significant hiatal hernia.  There was no aspiration or regurgitation.     Stomach is of small volume related to gastric sleeve.  There is no evidence of gastric sleeve migration above the level of the gastroesophageal junction.  No neofundus identified.  Contrast promptly transited from the stomach into the duodenum.  Duodenum shows unremarkable distensibility and folds.  There was initial rapid transit of contrast through nondistended loops of small bowel and opacification of loops of ileum was seen within 15 minutes.  Subsequent transit of contrast was rather slow and opacification of the colon was seen 2 hours into study.  Spot images of the small bowel were performed which is are without significant persistent stricture or abnormal dilatation.  Impression: 1. Dilated esophagus     2. Gastroesophageal reflux disease.     3. Status post gastric sleeve.     Electronically signed by:         Farhad  Turner  Date:                                        2023  Time:                                       16:06    Review of Systems:  12 point ROS negative except as stated in HPI    PAST HISTORY:     Past Medical History:   Diagnosis Date    Chronic back pain     Depression     GERD (gastroesophageal reflux disease)     History of stomach ulcers     Hypertension     IBS (irritable bowel syndrome)     Irregular heart beat     [place] grade D esophagitis     Sleep apnea, unspecified      Past Surgical History:   Procedure Laterality Date     SECTION      CHOLECYSTECTOMY  10/03/2012    COLONOSCOPY      ESOPHAGOGASTRODUODENOSCOPY      LAPAROSCOPIC SLEEVE GASTRECTOMY  2021    Dr. Higginbotham     Family History   Problem Relation Age of Onset    Hypertension Maternal Grandmother      Social History     Socioeconomic History    Marital status:    Tobacco Use    Smoking status: Never    Smokeless tobacco: Never   Substance and Sexual Activity    Alcohol use: Not Currently    Drug use: Never       MEDICATIONS & ALLERGIES:     Current Outpatient Medications on File Prior to Visit   Medication Sig    cetirizine (ZYRTEC) 10 MG tablet Take 1 tablet by mouth every morning.    famotidine (PEPCID) 40 MG tablet Take 40 mg by mouth every evening.    lisdexamfetamine (VYVANSE) 50 MG capsule Take by mouth.    montelukast (SINGULAIR) 10 mg tablet Take 10 mg by mouth.    pantoprazole (PROTONIX) 40 MG tablet 30    sucralfate (CARAFATE) 1 gram tablet     traZODone (DESYREL) 100 MG tablet Take 100 mg by mouth every evening.    TRINTELLIX 20 mg Tab Take 1 tablet by mouth every morning.    [DISCONTINUED] esomeprazole (NEXIUM) 40 MG capsule Take 40 mg by mouth 2 (two) times daily.    [DISCONTINUED] MOUNJARO 5 mg/0.5 mL PnIj Inject 0.5 mg into the skin every 7 days.     No current facility-administered medications on file prior to visit.     Review of patient's allergies indicates:   Allergen Reactions    Metformin  "Nausea And Vomiting       OBJECTIVE:     Vitals:    02/08/24 0943   BP: (!) 124/91   BP Location: Left arm   Patient Position: Sitting   Pulse: 75   Weight: 93 kg (205 lb)   Height: 5' 5" (1.651 m)     Body mass index is 34.11 kg/m².    Physical Exam:  General:  Well developed, well nourished, no acute distress  HEENT:  Normocephalic, atraumatic, PERRL, EOMI, clear sclera, ears normal, neck supple, throat clear without erythema or exudates  CVS:  RRR, S1 and S2 normal, no murmurs, rubs, gallops  Resp:  Lungs clear to auscultation, no wheezes, rales, rhonchi, cough  GI:  Abdomen soft, non-tender, non-distended, normoactive bowel sounds, no masses  :  Deferred  MSK:  No muscle atrophy, cyanosis, peripheral edema, full range of motion  Skin:  No rashes, ulcers, erythema  Neuro:  CNII-XII grossly intact  Psych:  Alert and oriented to person, place, and time    Results:  I have independently reviewed all pertinent lab and radiologic studies relevant to general surgery.      VISIT DIAGNOSES:       ICD-10-CM ICD-9-CM   1. Hiatal hernia  K44.9 553.3   2. Pre-operative examination  Z01.818 V72.84   3. Esophageal dysmotility  K22.4 530.5   4. Gastroesophageal reflux disease with esophagitis without hemorrhage  K21.00 530.81     530.10   5. History of sleeve gastrectomy  Z90.3 V15.29       ASSESSMENT/PLAN:       Plan: Laparoscopic gastrojejunostomy, laparoscopic hiatal hernia repair, and other indicated procedures 2/26/24    Pt does not qualify for standard anti-reflux surgery of fundoplication due to absence of fundus post sleeve gastrectomy, pt does not qualify for magnetic sphincter augmentation LINX procedure due to ineffective motility.   Recommend diversion procedure : convert to GJ bypass.     Recommend laparoscopic repair of hiatal hernia, laparoscopic gastrojejunostomy with Jerrica-en-Y reconstruction. Her esophagitis has progressively worsened and I am concerned about progression to Cabrera's esophagus, dysplasia, " metaplasia, or esophageal malignancy if left untreated.      She has failed conservative measures including dietary and medication. Recommend surgical intervention to stop the progression of reflux damage.         - Dr. Higginbotham examined patient, discussed recommendations,  obtained informed consent, and answered all questions with patient/family.      - Counseling included differential diagnoses, treatment options, risks and benefits, lifestyle changes, prognosis, and medications.    The patient was interactive, and verbalized understanding.     MARY Guadarrama        I, MARY Hyman, am scribing for, and in the presence of, Ted Higginbotham MD, performed the above scribed service and the documentation accurately describes the services I performed. I attest to the accuracy of the note.

## 2024-02-08 NOTE — H&P (VIEW-ONLY)
[facility] Surgical - General Surgery Services  [address] [place] [address]  Phone: [phone]  Fax: [phone]     HISTORY & PHYSICAL  General Surgery  Dr. Ted Higginbotham      Patient Name: Lisa Celestin  YOB: 1985  Author: Hailey Arshad, MARY     Date: 02/08/2024                   SUBJECTIVE:     Chief Complaint/Reason for Admission:   Chief Complaint   Patient presents with    Pre-op Exam     Gastro 02/26/24        History of Present Illness:  Ms. Lisa Celestin is a 38 y.o. female who presents to clinic for routine pre op visit. She is scheduled for Laparoscopic gastrojejunostomy and Laparoscopic hiatal hernia on 2/26/24 for intractable reflux.     Positive symptoms:Heartburn, Regurgitation, Dyspepsia, Upper Abdominal Pain, Nausea, and Vomiting.   nocturnal reflux,symptoms are severe. Wakes her up at night and she is not sleeping well. She has attempted dietary changes, multiple GI medications, elevating head of bed at night without success.      Previous history of Lap sleeve gastrectomy 2/1/2021 with Dr. Higginbotham.  She states she had reflux and heartburn symptoms prior to sleeve gastrectomy.      Pre op VSG weight 248 #  2 weeks post op 231 #  2 months post op 211 #  6 months post op 186 #          BMI 30.95 (Eugenio weight)  2.5 years post op 200 #          BMI 33.28    Current weight / pre op GJ bypass weight 205 # BMI 34.11     Previous treatments:   PPI, H2 Antagonist, OTC Antacids, and Rx Carafate     Referring provider: No ref. provider found   Patient Care Team:  Jevon Escobar MD as PCP - General (Family Medicine)  Ted Higginbotham MD as Surgeon (Bariatrics)      Pertinent workup:     UGI severe reflux, dilated esophagus  EGD Grade D esophagitis, HH,  gastritis.  Manometry ineffective motility, weak peristalsis, 5 failed swallows  GE study normal.     FL Upper GI With Small Bowel (xpd)  Narrative: EXAMINATION:  FL UPPER GI WITH SMALL  BOWEL (XPD)     CLINICAL HISTORY:  Diaphragmatic hernia without obstruction or gangrene     TECHNIQUE:   images were obtained of the abdomen.  Patient performed several barium swallows for this exam multiple fluoroscopic images were obtained of the esophagus and the stomach.  Additional oral contrast was given and serial abdomen images acquired.     Fluoroscopy time 1 minutes 22 seconds     Radiation dose 267 mGy     Cumulative DAP 11.06 uGym2     Total of 98 fluoroscopic images were acquired     COMPARISON:  None available     FINDINGS:   images of the abdomen show bilateral upper abdomen metallic clips.  Bowel gas pattern is nonspecific nonobstructive.  No apparent organomegaly.     Timing and emptying of barium from the hypopharynx is unremarkable.  Esophageal unremarkable primary and secondary peristalsis and there were no tertiary waveforms.  There is diffuse mild dilatation of the esophagus without stricture and there is no diverticulum.  There was gastroesophageal reflux during the exam.  There is no significant hiatal hernia.  There was no aspiration or regurgitation.     Stomach is of small volume related to gastric sleeve.  There is no evidence of gastric sleeve migration above the level of the gastroesophageal junction.  No neofundus identified.  Contrast promptly transited from the stomach into the duodenum.  Duodenum shows unremarkable distensibility and folds.  There was initial rapid transit of contrast through nondistended loops of small bowel and opacification of loops of ileum was seen within 15 minutes.  Subsequent transit of contrast was rather slow and opacification of the colon was seen 2 hours into study.  Spot images of the small bowel were performed which is are without significant persistent stricture or abnormal dilatation.  Impression: 1. Dilated esophagus     2. Gastroesophageal reflux disease.     3. Status post gastric sleeve.     Electronically signed by:         Farhad  Turner  Date:                                        2023  Time:                                       16:06    Review of Systems:  12 point ROS negative except as stated in HPI    PAST HISTORY:     Past Medical History:   Diagnosis Date    Chronic back pain     Depression     GERD (gastroesophageal reflux disease)     History of stomach ulcers     Hypertension     IBS (irritable bowel syndrome)     Irregular heart beat     [place] grade D esophagitis     Sleep apnea, unspecified      Past Surgical History:   Procedure Laterality Date     SECTION      CHOLECYSTECTOMY  10/03/2012    COLONOSCOPY      ESOPHAGOGASTRODUODENOSCOPY      LAPAROSCOPIC SLEEVE GASTRECTOMY  2021    Dr. Higginbotham     Family History   Problem Relation Age of Onset    Hypertension Maternal Grandmother      Social History     Socioeconomic History    Marital status:    Tobacco Use    Smoking status: Never    Smokeless tobacco: Never   Substance and Sexual Activity    Alcohol use: Not Currently    Drug use: Never       MEDICATIONS & ALLERGIES:     Current Outpatient Medications on File Prior to Visit   Medication Sig    cetirizine (ZYRTEC) 10 MG tablet Take 1 tablet by mouth every morning.    famotidine (PEPCID) 40 MG tablet Take 40 mg by mouth every evening.    lisdexamfetamine (VYVANSE) 50 MG capsule Take by mouth.    montelukast (SINGULAIR) 10 mg tablet Take 10 mg by mouth.    pantoprazole (PROTONIX) 40 MG tablet 30    sucralfate (CARAFATE) 1 gram tablet     traZODone (DESYREL) 100 MG tablet Take 100 mg by mouth every evening.    TRINTELLIX 20 mg Tab Take 1 tablet by mouth every morning.    [DISCONTINUED] esomeprazole (NEXIUM) 40 MG capsule Take 40 mg by mouth 2 (two) times daily.    [DISCONTINUED] MOUNJARO 5 mg/0.5 mL PnIj Inject 0.5 mg into the skin every 7 days.     No current facility-administered medications on file prior to visit.     Review of patient's allergies indicates:   Allergen Reactions    Metformin  "Nausea And Vomiting       OBJECTIVE:     Vitals:    02/08/24 0943   BP: (!) 124/91   BP Location: Left arm   Patient Position: Sitting   Pulse: 75   Weight: 93 kg (205 lb)   Height: 5' 5" (1.651 m)     Body mass index is 34.11 kg/m².    Physical Exam:  General:  Well developed, well nourished, no acute distress  HEENT:  Normocephalic, atraumatic, PERRL, EOMI, clear sclera, ears normal, neck supple, throat clear without erythema or exudates  CVS:  RRR, S1 and S2 normal, no murmurs, rubs, gallops  Resp:  Lungs clear to auscultation, no wheezes, rales, rhonchi, cough  GI:  Abdomen soft, non-tender, non-distended, normoactive bowel sounds, no masses  :  Deferred  MSK:  No muscle atrophy, cyanosis, peripheral edema, full range of motion  Skin:  No rashes, ulcers, erythema  Neuro:  CNII-XII grossly intact  Psych:  Alert and oriented to person, place, and time    Results:  I have independently reviewed all pertinent lab and radiologic studies relevant to general surgery.      VISIT DIAGNOSES:       ICD-10-CM ICD-9-CM   1. Hiatal hernia  K44.9 553.3   2. Pre-operative examination  Z01.818 V72.84   3. Esophageal dysmotility  K22.4 530.5   4. Gastroesophageal reflux disease with esophagitis without hemorrhage  K21.00 530.81     530.10   5. History of sleeve gastrectomy  Z90.3 V15.29       ASSESSMENT/PLAN:       Plan: Laparoscopic gastrojejunostomy, laparoscopic hiatal hernia repair, and other indicated procedures 2/26/24    Pt does not qualify for standard anti-reflux surgery of fundoplication due to absence of fundus post sleeve gastrectomy, pt does not qualify for magnetic sphincter augmentation LINX procedure due to ineffective motility.   Recommend diversion procedure : convert to GJ bypass.     Recommend laparoscopic repair of hiatal hernia, laparoscopic gastrojejunostomy with Jerrica-en-Y reconstruction. Her esophagitis has progressively worsened and I am concerned about progression to Cabrera's esophagus, dysplasia, " metaplasia, or esophageal malignancy if left untreated.      She has failed conservative measures including dietary and medication. Recommend surgical intervention to stop the progression of reflux damage.         - Dr. Higginbotham examined patient, discussed recommendations,  obtained informed consent, and answered all questions with patient/family.      - Counseling included differential diagnoses, treatment options, risks and benefits, lifestyle changes, prognosis, and medications.    The patient was interactive, and verbalized understanding.     MARY Guadarrama        I, MARY Hyman, am scribing for, and in the presence of, Ted Higginbotham MD, performed the above scribed service and the documentation accurately describes the services I performed. I attest to the accuracy of the note.

## 2024-02-12 ENCOUNTER — CLINICAL SUPPORT (OUTPATIENT)
Dept: BARIATRICS | Facility: HOSPITAL | Age: 39
End: 2024-02-12

## 2024-02-12 VITALS — WEIGHT: 209 LBS | BODY MASS INDEX: 34.78 KG/M2

## 2024-02-12 DIAGNOSIS — E66.9 OBESITY, UNSPECIFIED CLASSIFICATION, UNSPECIFIED OBESITY TYPE, UNSPECIFIED WHETHER SERIOUS COMORBIDITY PRESENT: Primary | ICD-10-CM

## 2024-02-12 DIAGNOSIS — E66.9 OBESITY: Primary | ICD-10-CM

## 2024-02-12 NOTE — PROGRESS NOTES
Pt here for 2 week pre op reviwed, reviewed pre op diet, T/F test and pre op questions.   Pt ate a Burger  and fries this weekend, stressed the importance of her following pre op diet exactly. No starches, sugar beverages. Cut out fruit on 2/21.     Current wt: 209#

## 2024-02-12 NOTE — PROGRESS NOTES
Patient attended preop education visit with team. Patient missed 2 on questions 13-18. Corrections were discussed. No issues noted. Her current weight is 209.2 lbs. So, she gained 4 lbs.    MEDHAT Meeks, CPT, CHC

## 2024-02-14 PROBLEM — Z90.3 HISTORY OF SLEEVE GASTRECTOMY: Status: ACTIVE | Noted: 2024-02-14

## 2024-02-14 NOTE — DISCHARGE INSTRUCTIONS
Cover BOBY drain site with 4x4 gauze and Medipore tape. Keep this area covered until closed. Pack penrose drain site BID with Iodoform gauze until closed. Remove all gauze packing and dressings once daily and wash area with antibacterial soap and water. Pat dry.       HOSPITAL DISCHARGE INSTRUCTIONS    Clinic Phone Numbers       Surgeons office number and after hours on call surgeon: [phone].    ALWAYS call the surgeons office PRIOR to going to an Urgent Care or the emergency room. If medical emergency, call 911.     Signs and Symptoms that would warrant a phone call of the office (regardless of the time of day):     Fever greater than 101 F     Uncontrolled pain that does not improve with pain medication     Uncontrolled nausea that does not improve with nausea medication      Vomiting     Shortness of breath     Chest Pain     Foul smelling drainage from incision and/or yellow or green drainage from incision     Red, hot painful incisions     Bloody bowel movements     ** If you feel as though it is a life-threatening emergency, call 911 and go to the emergency room**          Prescriptions     Medications     Pain medication (if needed) Tylenol over the counter is safe to take for discomfort     Anti-nausea medication (if needed)     Proton Pump Inhibitor (finish all 30 days), call [phone] if you did not receive 30 days of medication       Supplements     Chewable multivitamin- take 2 times a day (unless otherwise directed)     Chewable Calcium Citrate with D3- take 3 times a day (unless otherwise directed)     Iron tablet- take once daily      MiraLAX- take once daily for 2 weeks       Home Medications     Please review your medication list that you received at pre-op class as well as at the hospital instructions upon discharge to assure you are resuming all medications that are deemed  safe after surgery.      ** REMINDER- You should have scheduled your follow-up with your prescribing doctor for 1-week  post-op**          Appointments     Surgeons Post-op Visits- please review orange sheet you received in the mail after surgery. Call [phone] if you need to reschedule your surgeons post-op visit.      Bariatric Team Post-op Visits- please review blue sheet you received in your e-mail or please reference [facility] for your post-op appointments. . Call [phone] option 1 if you need to reschedule your bariatric team post-op visit.          Nutritional Considerations     Hydration is CRITICAL!     Daily fluid intake of  oz water     Water is more important than protein      Review list of allowable and non-allowable liquids in your Bariatric Booklet    The only fluids that count towards your water goal is water, sugar free, caffeine free flavored water        Diet progression          Continue the dietary protocol until you meet with the dietitian at your 2-week visit     Strive to reach protein goal. Only liquids counted toward your protein goal are protein shake, milk and approved yogurt     It is MANDATORY that you do not progress your diet without speaking to the dietitian to prevent potential complications          Incisional Care     Wash your hands before you touch your incisions or dressings     Remove any gauze or dressing over your incision. ONLY steri-strips (butter-fly band aids) should remain over your incision     Shower daily with an anti-bacterial soap (Hibiclens or Dial, orange bar).      Allow water to hit your back in the shower     Wet the incisions with water     Apply soap to a clean washcloth and wash over your incisions (do not scrub)     Rinse your incision with water and pat dry with a clean towel      Check your incisions daily for any redness, swelling, hot to touch, or bright red, green or yellow drainage.             Dark red, dried blood, indention of an incision, bruising may appear under the steri-strips. This is normal.     Do not apply any creams, ointments, etc.  on the incisions.      Leave incision open to air (unless instructed otherwise)          Activity     Walk and/or ride a stationary bike 20 minutes a day     Do not lift, pull or push anything greater than 10 pounds for 4-6 weeks     Do not go the gym until you are 4-6 weeks post-op     Use your incentive spirometer (breathing machine) 10 x an hour for 1-2 weeks     Shower daily, DO NOT submerge yourself in water until 2 weeks post-op and cleared by surgeon          Post-Operative Expectations     A soreness is to be expected. Pain differs for everyone. Please refer to the pain scale and list of when to call the doctor regarding pain.     Nausea can last a few weeks after surgery due to the body getting adjusted to the new small stomach. Take anti-nausea as needed and stay HYDRATED. Slight dehydration will cause nausea.     Constipation is common after surgery. Take MiraLAX daily even if your bowel movements are regular. Please refer to the FAQs regarding constipation. Water is essential in preventing constipation.        Constipation after Bariatric Surgery  The Basics     ***Due to the change in your diet just prior to surgery and immediately after surgery, it is very common to have a change in your bowel movements in the immediate post op period. It is completely normal to not have a bowel movement everyday in the first few weeks after bariatric surgery due to decreased oral intake. It is common not to have your first bowel movement for 4-5 days after surgery. If you don't have your first bowel movement 7 days after surgery, please contact surgeon's office to discuss.     What is constipation? -- Constipation is a common problem that makes it hard to have bowel movements. Your bowel movements might be:  Too hard  Too small  Hard to get out  Happening fewer than 3 times a week  What causes constipation after bariatric surgery? -- Constipation can be caused by:  High protein diet and decrease in water intake after  bariatric surgery  What other symptoms should I watch for? -- These symptoms could signal a more serious problem:  Blood in the toilet or on the toilet paper after having a bowel movement  Fever  Feeling weak  It could also be a sign of a problem if you have new constipation without a change in your medicines or diet, and have never had constipation in the past.   Is there anything I can do on my own to get rid of constipation? -- Yes. Try these steps:  Begin your MiraLAX the first day at home and continue it everyday if you are having normal soft bowel movement, even after the two weeks.   Please call the office [phone] if you do not have a bowel movement within 5 days of surgery.   Okay to take over the counter stool softeners once to twice per day, in addition to daily Miralax, if needed to help with post op constipation.   If you begin to have mutliple loose bowel movements (more than 3 per day), discontinue the stool softeners and Miralax.   If you continue to have multiple loose bowel movements per day (more than 5 loose bowel movements per day for more than 2 days in a row) after you have quit taking stool softeners and Miralax, contact surgeon's office to discuss this.  [phone]

## 2024-02-19 ENCOUNTER — TELEPHONE (OUTPATIENT)
Dept: SURGERY | Facility: CLINIC | Age: 39
End: 2024-02-19
Payer: COMMERCIAL

## 2024-02-19 ENCOUNTER — CLINICAL SUPPORT (OUTPATIENT)
Dept: BARIATRICS | Facility: HOSPITAL | Age: 39
End: 2024-02-19

## 2024-02-19 VITALS — WEIGHT: 205.5 LBS | BODY MASS INDEX: 34.2 KG/M2

## 2024-02-19 DIAGNOSIS — E66.9 OBESITY, UNSPECIFIED CLASSIFICATION, UNSPECIFIED OBESITY TYPE, UNSPECIFIED WHETHER SERIOUS COMORBIDITY PRESENT: Primary | ICD-10-CM

## 2024-02-19 RX ORDER — ESOMEPRAZOLE MAGNESIUM 40 MG/1
40 CAPSULE, DELAYED RELEASE ORAL
Status: ON HOLD | COMMUNITY
End: 2024-02-28 | Stop reason: HOSPADM

## 2024-02-19 RX ORDER — CHLORPHENIRAMINE MALEATE 4 MG
4 TABLET ORAL EVERY 6 HOURS PRN
COMMUNITY
End: 2024-04-29

## 2024-02-19 NOTE — TELEPHONE ENCOUNTER
Surgeon:  Dr. Ted Higginbotham   Procedure:  lap gastroj   Procedure Date:  02/26/24  Patient Concerns:  she has several questions   - RNY revision more riskier then sleeve   - liver diet she does have any reflux is surgery medically necessary  -she was dx with esophageal dysmotility wants to know if this surgery will resolved this issue  Pt went see her PCP and now has several questions   Please advise thanks

## 2024-02-21 ENCOUNTER — ANESTHESIA EVENT (OUTPATIENT)
Dept: SURGERY | Facility: HOSPITAL | Age: 39
DRG: 328 | End: 2024-02-21
Payer: COMMERCIAL

## 2024-02-22 RX ORDER — MELATONIN 5 MG
CAPSULE ORAL
COMMUNITY

## 2024-02-23 NOTE — PRE-PROCEDURE INSTRUCTIONS
"[facility]: Outpatient Surgery  Preprocedure Check-In Instructions     Your arrival time for your surgery or procedure is __0500____.  We ask patients to arrive about 2 hours before surgery to allow for enough time to review your health history & medications, start your IV, complete any outstanding labwork or tests, and meet your Anesthesiologist.    Expectations: "Because of inconsistent procedure completion times, an unexpected wait may occur. The Physicians would like you to be here to prepare in the event they run ahead of time. We will make you as comfortable as possible and keep you informed. We apologize in advance if this happens."    You will arrive at [facility], [address], [place].  Enter through the [place] entrance next to the Emergency Room, and come to the 6th floor to the Outpatient Surgery Department.     Visitory Policy:  You are allowed 2 adult visitors to be with you in the hospital. All hospital visitors should be in good current health.  No small children.     What to Bring:  Please have your ID, insurance cards, and all home medication bottles with you at check in.  Bring your CPAP machine if one is used at home.     Fasting:  Nothing to eat or drink after midnight the night before your procedure. This includes no ice, gum, hard candies, and/or tobacco products.  Follow your doctor's instructions for taking any medications on the morning of your procedure.  If no instructions for taking medications were given, do not take any medications but bring your medications in their bottles to your procedure check in.     Follow your doctor's preoperative instructions regarding skin prep, bowel prep, bathing, or showering prior to your procedure.  If any special soaps were provided to you, please use according to your doctor's instructions. If no instructions were given from your doctor, take a good bath or shower with antibacterial soap the night " before and the morning of your procedure.  On the morning of procedure, wear loose, comfortable clothing.  No lotions, makeup, perfumes, colognes, deodorant, or jewelry to your procedure.  Removable items (glasses, contact lenses, dentures, retainers, hearing aids) need to be removed for your procedure.  Bring your storage containers for these items if you wear them.     Artificial nails, body jewelry, eyelash extensions, and/or hair extensions with metal clips are not allowed during your surgery.  If you currently wear any of these items, please arrange for them to be removed prior to your arrival to the hospital.     Outpatient or Same Day Surgeries:  Any patients receiving sedation/anesthesia are advised not to drive for 24 hours after their procedure.  We do not allow patients to drive themselves home after discharge.  If you are going home after your procedure, please have someone available to drive you home from the hospital.        You may call the Outpatient Surgery Department at [phone] with any questions or concerns.  We are looking forward to meeting you and taking great care of you for your procedure.  Thank you for choosing [facility] for your surgical needs.

## 2024-02-26 ENCOUNTER — ANESTHESIA (OUTPATIENT)
Dept: SURGERY | Facility: HOSPITAL | Age: 39
DRG: 328 | End: 2024-02-26
Payer: COMMERCIAL

## 2024-02-26 ENCOUNTER — HOSPITAL ENCOUNTER (INPATIENT)
Facility: HOSPITAL | Age: 39
LOS: 2 days | Discharge: HOME | DRG: 328 | End: 2024-02-28
Attending: SURGERY | Admitting: SURGERY
Payer: COMMERCIAL

## 2024-02-26 DIAGNOSIS — K21.9 GASTROESOPHAGEAL REFLUX DISEASE, UNSPECIFIED WHETHER ESOPHAGITIS PRESENT: ICD-10-CM

## 2024-02-26 DIAGNOSIS — K21.00 GASTROESOPHAGEAL REFLUX DISEASE WITH ESOPHAGITIS WITHOUT HEMORRHAGE: ICD-10-CM

## 2024-02-26 LAB
GROUP & RH: NORMAL
INDIRECT COOMBS: NORMAL
POCT GLUCOSE: 158 MG/DL (ref 70–110)
SPECIMEN OUTDATE: NORMAL

## 2024-02-26 PROCEDURE — 0BQT4ZZ REPAIR DIAPHRAGM, PERCUTANEOUS ENDOSCOPIC APPROACH: ICD-10-PCS | Performed by: SURGERY

## 2024-02-26 PROCEDURE — 63600175 PHARM REV CODE 636 W HCPCS: Performed by: NURSE PRACTITIONER

## 2024-02-26 PROCEDURE — C1725 CATH, TRANSLUMIN NON-LASER: HCPCS | Performed by: SURGERY

## 2024-02-26 PROCEDURE — 27201423 OPTIME MED/SURG SUP & DEVICES STERILE SUPPLY: Performed by: SURGERY

## 2024-02-26 PROCEDURE — D9220A PRA ANESTHESIA: Mod: CRNA,,, | Performed by: NURSE ANESTHETIST, CERTIFIED REGISTERED

## 2024-02-26 PROCEDURE — 11000001 HC ACUTE MED/SURG PRIVATE ROOM

## 2024-02-26 PROCEDURE — C9290 INJ, BUPIVACAINE LIPOSOME: HCPCS | Performed by: SURGERY

## 2024-02-26 PROCEDURE — 37000009 HC ANESTHESIA EA ADD 15 MINS: Performed by: SURGERY

## 2024-02-26 PROCEDURE — 25000003 PHARM REV CODE 250: Performed by: NURSE PRACTITIONER

## 2024-02-26 PROCEDURE — 63600175 PHARM REV CODE 636 W HCPCS: Performed by: STUDENT IN AN ORGANIZED HEALTH CARE EDUCATION/TRAINING PROGRAM

## 2024-02-26 PROCEDURE — 37000008 HC ANESTHESIA 1ST 15 MINUTES: Performed by: SURGERY

## 2024-02-26 PROCEDURE — 36000710: Performed by: SURGERY

## 2024-02-26 PROCEDURE — 71000015 HC POSTOP RECOV 1ST HR: Performed by: SURGERY

## 2024-02-26 PROCEDURE — D9220A PRA ANESTHESIA: Mod: ANES,,, | Performed by: STUDENT IN AN ORGANIZED HEALTH CARE EDUCATION/TRAINING PROGRAM

## 2024-02-26 PROCEDURE — 0D164ZA BYPASS STOMACH TO JEJUNUM, PERCUTANEOUS ENDOSCOPIC APPROACH: ICD-10-PCS | Performed by: SURGERY

## 2024-02-26 PROCEDURE — 71000039 HC RECOVERY, EACH ADD'L HOUR: Performed by: SURGERY

## 2024-02-26 PROCEDURE — 36000711: Performed by: SURGERY

## 2024-02-26 PROCEDURE — C1729 CATH, DRAINAGE: HCPCS | Performed by: SURGERY

## 2024-02-26 PROCEDURE — 43644 LAP GASTRIC BYPASS/ROUX-EN-Y: CPT | Mod: 80,,, | Performed by: SURGERY

## 2024-02-26 PROCEDURE — 43644 LAP GASTRIC BYPASS/ROUX-EN-Y: CPT | Mod: ,,, | Performed by: SURGERY

## 2024-02-26 PROCEDURE — 86901 BLOOD TYPING SEROLOGIC RH(D): CPT | Performed by: NURSE PRACTITIONER

## 2024-02-26 PROCEDURE — 71000033 HC RECOVERY, INTIAL HOUR: Performed by: SURGERY

## 2024-02-26 PROCEDURE — 25000003 PHARM REV CODE 250: Performed by: STUDENT IN AN ORGANIZED HEALTH CARE EDUCATION/TRAINING PROGRAM

## 2024-02-26 PROCEDURE — 63600175 PHARM REV CODE 636 W HCPCS: Performed by: SURGERY

## 2024-02-26 RX ORDER — PROCHLORPERAZINE EDISYLATE 5 MG/ML
5 INJECTION INTRAMUSCULAR; INTRAVENOUS EVERY 6 HOURS PRN
Status: DISCONTINUED | OUTPATIENT
Start: 2024-02-26 | End: 2024-03-11

## 2024-02-26 RX ORDER — HYDRALAZINE HYDROCHLORIDE 20 MG/ML
10 INJECTION INTRAMUSCULAR; INTRAVENOUS EVERY 6 HOURS PRN
Status: DISPENSED | OUTPATIENT
Start: 2024-02-26

## 2024-02-26 RX ORDER — ACETAMINOPHEN 10 MG/ML
1000 INJECTION, SOLUTION INTRAVENOUS EVERY 8 HOURS
Status: DISPENSED | OUTPATIENT
Start: 2024-02-26 | End: 2024-02-27

## 2024-02-26 RX ORDER — INSULIN ASPART 100 [IU]/ML
1-10 INJECTION, SOLUTION INTRAVENOUS; SUBCUTANEOUS
Status: ACTIVE | OUTPATIENT
Start: 2024-02-26

## 2024-02-26 RX ORDER — KETOROLAC TROMETHAMINE 30 MG/ML
30 INJECTION, SOLUTION INTRAMUSCULAR; INTRAVENOUS EVERY 6 HOURS
Status: DISPENSED | OUTPATIENT
Start: 2024-02-26 | End: 2024-02-29

## 2024-02-26 RX ORDER — CLONIDINE 0.1 MG/24H
1 PATCH, EXTENDED RELEASE TRANSDERMAL ONCE AS NEEDED
Status: DISPENSED | OUTPATIENT
Start: 2024-02-26 | End: 2035-07-25

## 2024-02-26 RX ORDER — HYDROMORPHONE HYDROCHLORIDE 2 MG/ML
0.4 INJECTION, SOLUTION INTRAMUSCULAR; INTRAVENOUS; SUBCUTANEOUS EVERY 5 MIN PRN
Status: DISCONTINUED | OUTPATIENT
Start: 2024-02-26 | End: 2024-02-26 | Stop reason: HOSPADM

## 2024-02-26 RX ORDER — MIDAZOLAM HYDROCHLORIDE 1 MG/ML
INJECTION INTRAMUSCULAR; INTRAVENOUS
Status: DISCONTINUED | OUTPATIENT
Start: 2024-02-26 | End: 2024-02-26

## 2024-02-26 RX ORDER — SODIUM CHLORIDE, SODIUM LACTATE, POTASSIUM CHLORIDE, CALCIUM CHLORIDE 600; 310; 30; 20 MG/100ML; MG/100ML; MG/100ML; MG/100ML
INJECTION, SOLUTION INTRAVENOUS CONTINUOUS
Status: ACTIVE | OUTPATIENT
Start: 2024-02-26

## 2024-02-26 RX ORDER — LABETALOL HYDROCHLORIDE 5 MG/ML
10 INJECTION, SOLUTION INTRAVENOUS
Status: ACTIVE | OUTPATIENT
Start: 2024-02-26

## 2024-02-26 RX ORDER — BUPIVACAINE HYDROCHLORIDE 5 MG/ML
INJECTION, SOLUTION EPIDURAL; INTRACAUDAL
Status: DISCONTINUED | OUTPATIENT
Start: 2024-02-26 | End: 2024-02-26 | Stop reason: HOSPADM

## 2024-02-26 RX ORDER — LIDOCAINE HYDROCHLORIDE 20 MG/ML
INJECTION, SOLUTION EPIDURAL; INFILTRATION; INTRACAUDAL; PERINEURAL
Status: DISCONTINUED | OUTPATIENT
Start: 2024-02-26 | End: 2024-02-26

## 2024-02-26 RX ORDER — ONDANSETRON 4 MG/1
4 TABLET, ORALLY DISINTEGRATING ORAL EVERY 6 HOURS PRN
Status: ACTIVE | OUTPATIENT
Start: 2024-02-27

## 2024-02-26 RX ORDER — TRAZODONE HYDROCHLORIDE 100 MG/1
100 TABLET ORAL NIGHTLY
Status: DISCONTINUED | OUTPATIENT
Start: 2024-02-27 | End: 2024-02-28 | Stop reason: HOSPADM

## 2024-02-26 RX ORDER — SUCCINYLCHOLINE CHLORIDE 20 MG/ML
INJECTION INTRAMUSCULAR; INTRAVENOUS
Status: DISCONTINUED | OUTPATIENT
Start: 2024-02-26 | End: 2024-02-26

## 2024-02-26 RX ORDER — TRAMADOL HYDROCHLORIDE 50 MG/1
100 TABLET ORAL EVERY 6 HOURS PRN
Status: DISCONTINUED | OUTPATIENT
Start: 2024-02-26 | End: 2024-03-11

## 2024-02-26 RX ORDER — IBUPROFEN 200 MG
16 TABLET ORAL
Status: ACTIVE | OUTPATIENT
Start: 2024-02-26

## 2024-02-26 RX ORDER — MIDAZOLAM HYDROCHLORIDE 1 MG/ML
2 INJECTION INTRAMUSCULAR; INTRAVENOUS ONCE
Status: ACTIVE | OUTPATIENT
Start: 2024-02-26

## 2024-02-26 RX ORDER — HEPARIN SODIUM 5000 [USP'U]/ML
5000 INJECTION, SOLUTION INTRAVENOUS; SUBCUTANEOUS
Status: COMPLETED | OUTPATIENT
Start: 2024-02-26 | End: 2024-02-26

## 2024-02-26 RX ORDER — ACETAMINOPHEN 500 MG
1000 TABLET ORAL
Status: COMPLETED | OUTPATIENT
Start: 2024-02-26 | End: 2024-02-26

## 2024-02-26 RX ORDER — SODIUM CHLORIDE 0.9 % (FLUSH) 0.9 %
10 SYRINGE (ML) INJECTION
Status: DISCONTINUED | OUTPATIENT
Start: 2024-02-26 | End: 2024-02-26 | Stop reason: HOSPADM

## 2024-02-26 RX ORDER — ONDANSETRON HYDROCHLORIDE 2 MG/ML
INJECTION, SOLUTION INTRAMUSCULAR; INTRAVENOUS
Status: DISCONTINUED | OUTPATIENT
Start: 2024-02-26 | End: 2024-02-26

## 2024-02-26 RX ORDER — FENTANYL CITRATE 50 UG/ML
INJECTION, SOLUTION INTRAMUSCULAR; INTRAVENOUS
Status: DISCONTINUED | OUTPATIENT
Start: 2024-02-26 | End: 2024-02-26

## 2024-02-26 RX ORDER — CARIPRAZINE 1.5 MG/1
3 CAPSULE, GELATIN COATED ORAL DAILY
COMMUNITY

## 2024-02-26 RX ORDER — MONTELUKAST SODIUM 10 MG/1
10 TABLET ORAL DAILY
Status: DISCONTINUED | OUTPATIENT
Start: 2024-02-27 | End: 2024-02-28 | Stop reason: HOSPADM

## 2024-02-26 RX ORDER — HEPARIN SODIUM 5000 [USP'U]/ML
5000 INJECTION, SOLUTION INTRAVENOUS; SUBCUTANEOUS DAILY
Status: DISCONTINUED | OUTPATIENT
Start: 2024-02-26 | End: 2024-03-12

## 2024-02-26 RX ORDER — PROMETHAZINE HYDROCHLORIDE 12.5 MG/1
12.5 TABLET ORAL EVERY 6 HOURS PRN
Status: DISCONTINUED | OUTPATIENT
Start: 2024-02-26 | End: 2024-03-11

## 2024-02-26 RX ORDER — SODIUM CHLORIDE, SODIUM LACTATE, POTASSIUM CHLORIDE, CALCIUM CHLORIDE 600; 310; 30; 20 MG/100ML; MG/100ML; MG/100ML; MG/100ML
INJECTION, SOLUTION INTRAVENOUS CONTINUOUS
Status: DISCONTINUED | OUTPATIENT
Start: 2024-02-26 | End: 2024-02-27

## 2024-02-26 RX ORDER — ONDANSETRON 4 MG/1
4 TABLET, ORALLY DISINTEGRATING ORAL
Status: COMPLETED | OUTPATIENT
Start: 2024-02-26 | End: 2024-02-26

## 2024-02-26 RX ORDER — ONDANSETRON HYDROCHLORIDE 2 MG/ML
4 INJECTION, SOLUTION INTRAVENOUS EVERY 6 HOURS PRN
Status: ACTIVE | OUTPATIENT
Start: 2024-02-26

## 2024-02-26 RX ORDER — PROPOFOL 10 MG/ML
VIAL (ML) INTRAVENOUS
Status: DISCONTINUED | OUTPATIENT
Start: 2024-02-26 | End: 2024-02-26

## 2024-02-26 RX ORDER — DEXAMETHASONE SODIUM PHOSPHATE 4 MG/ML
INJECTION, SOLUTION INTRA-ARTICULAR; INTRALESIONAL; INTRAMUSCULAR; INTRAVENOUS; SOFT TISSUE
Status: DISCONTINUED | OUTPATIENT
Start: 2024-02-26 | End: 2024-02-26

## 2024-02-26 RX ORDER — HYOSCYAMINE SULFATE 0.12 MG/1
0.12 TABLET SUBLINGUAL EVERY 4 HOURS PRN
Status: DISPENSED | OUTPATIENT
Start: 2024-02-26

## 2024-02-26 RX ORDER — GABAPENTIN 300 MG/1
600 CAPSULE ORAL
Status: COMPLETED | OUTPATIENT
Start: 2024-02-26 | End: 2024-02-26

## 2024-02-26 RX ORDER — CEFAZOLIN SODIUM 2 G/50ML
2 SOLUTION INTRAVENOUS
Status: DISPENSED | OUTPATIENT
Start: 2024-02-26

## 2024-02-26 RX ORDER — ROCURONIUM BROMIDE 10 MG/ML
INJECTION, SOLUTION INTRAVENOUS
Status: DISCONTINUED | OUTPATIENT
Start: 2024-02-26 | End: 2024-02-26

## 2024-02-26 RX ORDER — SCOLOPAMINE TRANSDERMAL SYSTEM 1 MG/1
1 PATCH, EXTENDED RELEASE TRANSDERMAL
Status: DISCONTINUED | OUTPATIENT
Start: 2024-02-26 | End: 2024-02-28 | Stop reason: HOSPADM

## 2024-02-26 RX ORDER — GLUCAGON 1 MG
1 KIT INJECTION
Status: ACTIVE | OUTPATIENT
Start: 2024-02-26

## 2024-02-26 RX ORDER — CYPROHEPTADINE HYDROCHLORIDE 4 MG/1
4 TABLET ORAL 2 TIMES DAILY
COMMUNITY
End: 2024-04-29

## 2024-02-26 RX ORDER — IBUPROFEN 200 MG
24 TABLET ORAL
Status: ACTIVE | OUTPATIENT
Start: 2024-02-26

## 2024-02-26 RX ORDER — PANTOPRAZOLE SODIUM 40 MG/1
40 TABLET, DELAYED RELEASE ORAL DAILY
Status: DISCONTINUED | OUTPATIENT
Start: 2024-02-27 | End: 2024-03-11

## 2024-02-26 RX ORDER — SODIUM CITRATE AND CITRIC ACID MONOHYDRATE 334; 500 MG/5ML; MG/5ML
30 SOLUTION ORAL ONCE
Status: COMPLETED | OUTPATIENT
Start: 2024-02-26 | End: 2024-02-26

## 2024-02-26 RX ORDER — ACETAMINOPHEN 500 MG
1000 TABLET ORAL EVERY 8 HOURS
Status: DISCONTINUED | OUTPATIENT
Start: 2024-02-27 | End: 2024-03-13

## 2024-02-26 RX ORDER — CELECOXIB 200 MG/1
200 CAPSULE ORAL
Status: COMPLETED | OUTPATIENT
Start: 2024-02-26 | End: 2024-02-26

## 2024-02-26 RX ADMIN — SUCCINYLCHOLINE CHLORIDE 120 MG: 20 INJECTION, SOLUTION INTRAMUSCULAR; INTRAVENOUS at 07:02

## 2024-02-26 RX ADMIN — ROCURONIUM BROMIDE 10 MG: 10 SOLUTION INTRAVENOUS at 07:02

## 2024-02-26 RX ADMIN — ONDANSETRON 4 MG: 4 TABLET, ORALLY DISINTEGRATING ORAL at 05:02

## 2024-02-26 RX ADMIN — MIDAZOLAM HYDROCHLORIDE 2 MG: 1 INJECTION, SOLUTION INTRAMUSCULAR; INTRAVENOUS at 07:02

## 2024-02-26 RX ADMIN — LIDOCAINE HYDROCHLORIDE 4 ML: 20 INJECTION, SOLUTION EPIDURAL; INFILTRATION; INTRACAUDAL; PERINEURAL at 07:02

## 2024-02-26 RX ADMIN — FENTANYL CITRATE 100 MCG: 50 INJECTION, SOLUTION INTRAMUSCULAR; INTRAVENOUS at 08:02

## 2024-02-26 RX ADMIN — ACETAMINOPHEN 1000 MG: 500 TABLET ORAL at 05:02

## 2024-02-26 RX ADMIN — PROPOFOL 170 MG: 10 INJECTION, EMULSION INTRAVENOUS at 07:02

## 2024-02-26 RX ADMIN — SODIUM CHLORIDE, SODIUM GLUCONATE, SODIUM ACETATE, POTASSIUM CHLORIDE AND MAGNESIUM CHLORIDE: 526; 502; 368; 37; 30 INJECTION, SOLUTION INTRAVENOUS at 07:02

## 2024-02-26 RX ADMIN — SUGAMMADEX 200 MG: 100 INJECTION, SOLUTION INTRAVENOUS at 09:02

## 2024-02-26 RX ADMIN — ROCURONIUM BROMIDE 10 MG: 10 SOLUTION INTRAVENOUS at 08:02

## 2024-02-26 RX ADMIN — ACETAMINOPHEN 1000 MG: 10 INJECTION, SOLUTION INTRAVENOUS at 11:02

## 2024-02-26 RX ADMIN — ROCURONIUM BROMIDE 30 MG: 10 SOLUTION INTRAVENOUS at 07:02

## 2024-02-26 RX ADMIN — ACETAMINOPHEN 1000 MG: 10 INJECTION, SOLUTION INTRAVENOUS at 02:02

## 2024-02-26 RX ADMIN — SCOPOLAMINE 1 PATCH: 1 PATCH TRANSDERMAL at 06:02

## 2024-02-26 RX ADMIN — SODIUM CITRATE AND CITRIC ACID MONOHYDRATE 30 ML: 500; 334 SOLUTION ORAL at 06:02

## 2024-02-26 RX ADMIN — KETOROLAC TROMETHAMINE 30 MG: 30 INJECTION, SOLUTION INTRAMUSCULAR; INTRAVENOUS at 12:02

## 2024-02-26 RX ADMIN — GABAPENTIN 600 MG: 300 CAPSULE ORAL at 05:02

## 2024-02-26 RX ADMIN — TRAMADOL HYDROCHLORIDE 100 MG: 50 TABLET, COATED ORAL at 07:02

## 2024-02-26 RX ADMIN — DEXAMETHASONE SODIUM PHOSPHATE 4 MG: 4 INJECTION, SOLUTION INTRA-ARTICULAR; INTRALESIONAL; INTRAMUSCULAR; INTRAVENOUS; SOFT TISSUE at 07:02

## 2024-02-26 RX ADMIN — HYDROMORPHONE HYDROCHLORIDE 0.4 MG: 2 INJECTION INTRAMUSCULAR; INTRAVENOUS; SUBCUTANEOUS at 09:02

## 2024-02-26 RX ADMIN — HEPARIN SODIUM 5000 UNITS: 5000 INJECTION INTRAVENOUS; SUBCUTANEOUS at 05:02

## 2024-02-26 RX ADMIN — CELECOXIB 200 MG: 200 CAPSULE ORAL at 05:02

## 2024-02-26 RX ADMIN — SODIUM CHLORIDE, POTASSIUM CHLORIDE, SODIUM LACTATE AND CALCIUM CHLORIDE: 600; 310; 30; 20 INJECTION, SOLUTION INTRAVENOUS at 09:02

## 2024-02-26 RX ADMIN — HYDROMORPHONE HYDROCHLORIDE 0.4 MG: 2 INJECTION INTRAMUSCULAR; INTRAVENOUS; SUBCUTANEOUS at 10:02

## 2024-02-26 RX ADMIN — FENTANYL CITRATE 100 MCG: 50 INJECTION, SOLUTION INTRAMUSCULAR; INTRAVENOUS at 07:02

## 2024-02-26 RX ADMIN — METHOCARBAMOL 1000 MG: 100 INJECTION INTRAMUSCULAR; INTRAVENOUS at 09:02

## 2024-02-26 RX ADMIN — CEFAZOLIN SODIUM 2 G: 2 SOLUTION INTRAVENOUS at 07:02

## 2024-02-26 RX ADMIN — KETOROLAC TROMETHAMINE 30 MG: 30 INJECTION, SOLUTION INTRAMUSCULAR; INTRAVENOUS at 05:02

## 2024-02-26 RX ADMIN — SODIUM CHLORIDE, POTASSIUM CHLORIDE, SODIUM LACTATE AND CALCIUM CHLORIDE: 600; 310; 30; 20 INJECTION, SOLUTION INTRAVENOUS at 04:02

## 2024-02-26 RX ADMIN — ONDANSETRON 4 MG: 2 INJECTION INTRAMUSCULAR; INTRAVENOUS at 09:02

## 2024-02-26 NOTE — PROGRESS NOTES
Patient transferred to room 837 via bed with support person at bedside. Patient A&Ox4. No deficits. Report given to DULCE MARIA callaway.

## 2024-02-26 NOTE — TRANSFER OF CARE
"Anesthesia Transfer of Care Note    Patient: Lisa Celestin    Procedure(s) Performed: Procedure(s):  GASTROJEJUNOSTOMY, LAPAROSCOPIC    Patient location: PACU    Anesthesia Type: general    Transport from OR: Transported from OR on 6-10 L/min O2 by face mask with adequate spontaneous ventilation    Post pain: adequate analgesia    Post assessment: no apparent anesthetic complications    Post vital signs: stable    Level of consciousness: awake and sedated    Nausea/Vomiting: no nausea/vomiting    Complications: none    Transfer of care protocol was followed      Last vitals: Visit Vitals  /77   Pulse 98   Temp 36.9 °C (98.4 °F) (Temporal)   Resp (!) 21   Ht 5' 5" (1.651 m)   Wt 90.9 kg (200 lb 6.4 oz)   SpO2 95%   Breastfeeding No   BMI 33.35 kg/m²     "

## 2024-02-26 NOTE — ANESTHESIA PROCEDURE NOTES
Intubation    Date/Time: 2/26/2024 7:24 AM  Location procedure was performed: [facility] ANESTHESIOLOGY    Performed by: Melvin Resendiz MD  Authorized by: Andres Saini MD  Indications: airway protection  Intubation method: direct  Patient status: sedated  Preoxygenation: mask  Pretreatment medications: fentanyl and midazolam  Paralytic: succinylcholine  Laryngoscope size: Guerrero 2  Tube size: 7.0 mm  Tube type: cuffed  Number of attempts: 1  Cricoid pressure: yes  Cords visualized: yes  Post-procedure assessment: chest rise, ETCO2 monitor and CO2 detector  Breath sounds: clear  Cuff inflated: yes  ETT to lip: 21 cm  Tube secured with: adhesive tape  Complications: No

## 2024-02-26 NOTE — ANESTHESIA PREPROCEDURE EVALUATION
02/26/2024  Lisa Celestin is a 39 y.o., female.      Pre-op Assessment    I have reviewed the Patient Summary Reports.     I have reviewed the Nursing Notes. I have reviewed the NPO Status.   I have reviewed the Medications.     Review of Systems  Anesthesia Hx:               Denies Personal Hx of Anesthesia complications.                    Cardiovascular:  Exercise tolerance: good                                           Pulmonary:  Pulmonary Normal                       Hepatic/GI:    Hiatal Hernia, GERD             Neurological:      Headaches                                 Endocrine:        Obesity / BMI > 30  Psych:  Psychiatric History                  Physical Exam  General: Well nourished, Cooperative and Alert    Airway:  Mallampati: II   Mouth Opening: Normal  TM Distance: Normal  Tongue: Normal    Dental:  Intact    Chest/Lungs:  Normal Respiratory Rate        Anesthesia Plan  Type of Anesthesia, risks & benefits discussed:    Anesthesia Type: Gen ETT  Intra-op Monitoring Plan: Standard ASA Monitors  Post Op Pain Control Plan: multimodal analgesia  Induction:  rapid sequence and IV  Informed Consent: Informed consent signed with the Patient and all parties understand the risks and agree with anesthesia plan.  All questions answered.   ASA Score: 2  Day of Surgery Review of History & Physical: H&P Update referred to the surgeon/provider.    Ready For Surgery From Anesthesia Perspective.     .

## 2024-02-26 NOTE — BRIEF OP NOTE
[facility] - Periop Services  Brief Operative Note    SUMMARY     Surgery Date: 2/26/2024     Surgeon(s)      * Ted Higginbotham MD     Assistant Surgeon:     * Karl Yoder MD    Private Assist: KIM Arshad NP    Pre-op Diagnosis:    Gastroesophageal reflux disease  2. Recurrent Hiatal Hernia  3. History of Laparoscopic Sleeve Gastrectomy and Laparoscopic hiatal hernia repair in 2021  4. Grade D esophagitis  5. Ineffective esophageal motility  6. Obesity BMI 33.35    Post-op Diagnosis:  same as above    Procedure: Laparoscopic repair of recurrent hiatal hernia, Laparoscopic gastrojejunostomy with Jerrica-en-Y reconstruction, KIRILL block    Anesthesia: General    Operative Findings: dictated per surgeon    Estimated Blood Loss: 20 cc    Estimated Blood Loss has been documented.         Specimens:   Specimen (24h ago, onward)       Start     Ordered    02/26/24 0929  Specimen to Pathology  RELEASE UPON ORDERING        References:    Click here for ordering Quick Tip   Question:  Release to patient  Answer:  Immediate    02/26/24 0935                    [id number]

## 2024-02-27 LAB
ANION GAP SERPL CALC-SCNC: 6 MEQ/L
BASOPHILS # BLD AUTO: 0.08 X10(3)/MCL
BASOPHILS NFR BLD AUTO: 1 %
BUN SERPL-MCNC: 8.8 MG/DL (ref 7–18.7)
CALCIUM SERPL-MCNC: 8.2 MG/DL (ref 8.4–10.2)
CHLORIDE SERPL-SCNC: 111 MMOL/L (ref 98–107)
CO2 SERPL-SCNC: 22 MMOL/L (ref 22–29)
CREAT SERPL-MCNC: 0.56 MG/DL (ref 0.55–1.02)
CREAT/UREA NIT SERPL: 16
EOSINOPHIL # BLD AUTO: 0.12 X10(3)/MCL (ref 0–0.9)
EOSINOPHIL NFR BLD AUTO: 1.5 %
ERYTHROCYTE [DISTWIDTH] IN BLOOD BY AUTOMATED COUNT: 12.4 % (ref 11.5–17)
GFR SERPLBLD CREATININE-BSD FMLA CKD-EPI: >60 MLS/MIN/1.73/M2
GLUCOSE SERPL-MCNC: 84 MG/DL (ref 74–100)
HCT VFR BLD AUTO: 37.3 % (ref 37–47)
HGB BLD-MCNC: 12.6 G/DL (ref 12–16)
IMM GRANULOCYTES # BLD AUTO: 0.05 X10(3)/MCL (ref 0–0.04)
IMM GRANULOCYTES NFR BLD AUTO: 0.6 %
LYMPHOCYTES # BLD AUTO: 1.47 X10(3)/MCL (ref 0.6–4.6)
LYMPHOCYTES NFR BLD AUTO: 18.1 %
MCH RBC QN AUTO: 30.4 PG (ref 27–31)
MCHC RBC AUTO-ENTMCNC: 33.8 G/DL (ref 33–36)
MCV RBC AUTO: 89.9 FL (ref 80–94)
MONOCYTES # BLD AUTO: 0.63 X10(3)/MCL (ref 0.1–1.3)
MONOCYTES NFR BLD AUTO: 7.8 %
NEUTROPHILS # BLD AUTO: 5.77 X10(3)/MCL (ref 2.1–9.2)
NEUTROPHILS NFR BLD AUTO: 71 %
NRBC BLD AUTO-RTO: 0 %
PLATELET # BLD AUTO: 237 X10(3)/MCL (ref 130–400)
PMV BLD AUTO: 9.6 FL (ref 7.4–10.4)
POCT GLUCOSE: 83 MG/DL (ref 70–110)
POTASSIUM SERPL-SCNC: 4.2 MMOL/L (ref 3.5–5.1)
RBC # BLD AUTO: 4.15 X10(6)/MCL (ref 4.2–5.4)
SODIUM SERPL-SCNC: 139 MMOL/L (ref 136–145)
WBC # SPEC AUTO: 8.12 X10(3)/MCL (ref 4.5–11.5)

## 2024-02-27 PROCEDURE — 25000003 PHARM REV CODE 250: Performed by: NURSE PRACTITIONER

## 2024-02-27 PROCEDURE — 11000001 HC ACUTE MED/SURG PRIVATE ROOM

## 2024-02-27 PROCEDURE — 80048 BASIC METABOLIC PNL TOTAL CA: CPT | Performed by: NURSE PRACTITIONER

## 2024-02-27 PROCEDURE — 85025 COMPLETE CBC W/AUTO DIFF WBC: CPT | Performed by: NURSE PRACTITIONER

## 2024-02-27 PROCEDURE — 63600175 PHARM REV CODE 636 W HCPCS: Performed by: NURSE PRACTITIONER

## 2024-02-27 RX ADMIN — TRAMADOL HYDROCHLORIDE 100 MG: 50 TABLET, COATED ORAL at 07:02

## 2024-02-27 RX ADMIN — KETOROLAC TROMETHAMINE 30 MG: 30 INJECTION, SOLUTION INTRAMUSCULAR; INTRAVENOUS at 11:02

## 2024-02-27 RX ADMIN — KETOROLAC TROMETHAMINE 30 MG: 30 INJECTION, SOLUTION INTRAMUSCULAR; INTRAVENOUS at 06:02

## 2024-02-27 RX ADMIN — HEPARIN SODIUM 5000 UNITS: 5000 INJECTION, SOLUTION INTRAVENOUS; SUBCUTANEOUS at 08:02

## 2024-02-27 RX ADMIN — SODIUM CHLORIDE, POTASSIUM CHLORIDE, SODIUM LACTATE AND CALCIUM CHLORIDE: 600; 310; 30; 20 INJECTION, SOLUTION INTRAVENOUS at 01:02

## 2024-02-27 RX ADMIN — ACETAMINOPHEN 1000 MG: 500 TABLET ORAL at 08:02

## 2024-02-27 RX ADMIN — ACETAMINOPHEN 1000 MG: 500 TABLET ORAL at 03:02

## 2024-02-27 RX ADMIN — TRAMADOL HYDROCHLORIDE 100 MG: 50 TABLET, COATED ORAL at 11:02

## 2024-02-27 RX ADMIN — KETOROLAC TROMETHAMINE 30 MG: 30 INJECTION, SOLUTION INTRAMUSCULAR; INTRAVENOUS at 12:02

## 2024-02-27 NOTE — PROGRESS NOTES
[facility] - 8th Floor Med Surg  General Surgery  Progress Note    Bariatric Hospital Progress Note  POD# 1 S/P Lap Jerrica en Y Gastric Bypass  Dr. Ted Higginbotham  Patient Name: Lisa Celestin  YOB: 1985  Author: Hailey Arshad, MARY     Date: 02/27/2024      Admit Diagnosis:    Gastroesophageal reflux disease  2. Recurrent Hiatal Hernia  3. History of Laparoscopic Sleeve Gastrectomy and Laparoscopic hiatal hernia repair in 2021  4. Grade D esophagitis  5. Ineffective esophageal motility  6. Obesity BMI 33.35     Procedure: Laparoscopic repair of recurrent hiatal hernia, Laparoscopic gastrojejunostomy with Jerrica-en-Y reconstruction, KIRILL block    Hospital Course:  Ms. Lisa Celestin is a 39 y.o. female who was admitted for an elective bariatric procedure. Procedure performed as stated above. Upon completion of procedure, pt was transferred from the recovery room to the post surgical floor for further care and treatment. POD#1, afebrile, vital signs stable. The pt's diet was advanced to bariatric clear liquids.     Review of Systems: Mild incisional pain reported but tolerable. Some gas pain in chest and in between shoulder blades. No nausea, vomiting, dysphagia, GERD. Patient ambulating in the room/hallway and voiding without difficulty. Pt denies any dizziness, palpitations, SOB, or CP. All other review of systems are negative.     Physical Examination:   Vital signs: stable, noted in chart. T max x 24 hrs is 99.0. T current 98.1.  General: Awake and alert, able to answer all questions. Resting in bed with family member at bedside  Respiratory:  Clear to auscultation bilaterally  Cardio: Regular rate and rhythm.  Abdomen: Soft, non distended. Bowel sounds present to all 4 quadrants. Lap sites clean, dry, and intact. Appropriate point tenderness to lap sites. Abdomen benign. BOBY drain to left mid abdomen with SS/thin bloody drainage, penrose drain to LUQ intact.   Neuro: Alert and  oriented x's 4.    BBOY output x 24 hours : 160 cc documented x 24 hours    Labs:  Unremarkable, reviewed.       Medications:  Continuous Infusions:   lactated ringers      lactated ringers 125 mL/hr at 02/27/24 0155     Scheduled Meds:   acetaminophen  1,000 mg Intravenous Q8H    acetaminophen  1,000 mg Oral Q8H    cariprazine  1.5 mg Oral Daily    heparin (porcine)  5,000 Units Subcutaneous Daily    ketorolac  30 mg Intravenous Q6H    midazolam  2 mg Intravenous Once    montelukast  10 mg Oral Daily    pantoprazole  40 mg Oral Daily    scopolamine  1 patch Transdermal Q3 Days    traZODone  100 mg Oral QHS     PRN Meds:ceFAZolin (Ancef) IV (PEDS and ADULTS), cloNIDine 0.1 mg/24 hr td ptwk, dextrose 10%, dextrose 10%, glucagon (human recombinant), glucose, glucose, hydrALAZINE, hyoscyamine, insulin aspart U-100, labetaloL, ondansetron, ondansetron, prochlorperazine, promethazine, traMADoL       Vital Signs (Most Recent):  Temp: 98.1 °F (36.7 °C) (02/27/24 0810)  Pulse: 75 (02/27/24 0810)  Resp: 19 (02/27/24 0810)  BP: 129/87 (02/27/24 0810)  SpO2: 98 % (02/27/24 0810) Vital Signs (24h Range):  Temp:  [98 °F (36.7 °C)-99 °F (37.2 °C)] 98.1 °F (36.7 °C)  Pulse:  [] 75  Resp:  [16-26] 19  SpO2:  [88 %-98 %] 98 %  BP: (100-144)/(70-98) 129/87       Intake/Output Summary (Last 24 hours) at 2/27/2024 0857  Last data filed at 2/27/2024 0545  Gross per 24 hour   Intake 1090 ml   Output 1210 ml   Net -120 ml       Significant Labs:  BMP:   Recent Labs   Lab 02/27/24  0711      K 4.2   CO2 22   BUN 8.8   CREATININE 0.56   CALCIUM 8.2*     CBC:   Recent Labs   Lab 02/27/24  0711   WBC 8.12   RBC 4.15*   HGB 12.6   HCT 37.3      MCV 89.9   MCH 30.4   MCHC 33.8       Significant Diagnostics:  None    Assessment/Plan:     Active Diagnoses:    Diagnosis Date Noted POA    PRINCIPAL PROBLEM:  Gastroesophageal reflux disease with esophagitis without hemorrhage [K21.00] 02/08/2024 Yes    History of sleeve gastrectomy  [Z90.3] 02/14/2024 Not Applicable    Hiatal hernia [K44.9] 02/08/2024 Yes    Esophageal dysmotility [K22.4] 02/08/2024 Yes      Problems Resolved During this Admission:       Impression and Plan:     Pt POD#1 status post Lap RNY GBP is progressing well per bariatric protocol.     - Continue bariatric clears today, increase PO intake as tolerated.  - Start wound care to penrose drain site and BOBY drain site today.   - Continue ambulation in connell  - Continue IS  - Continue IV fluids  - Resume home meds as appropriate  - Keep additional night for observation, consider discharge tomorrow if patient able to polo PO and remains hemodynamically stable.     Hailey Arshad, MARY  General Surgery  [facility] - 8th Floor Med Surg

## 2024-02-27 NOTE — PLAN OF CARE
Problem: Adult Inpatient Plan of Care  Goal: Plan of Care Review  Outcome: Ongoing, Progressing  Goal: Patient-Specific Goal (Individualized)  Outcome: Ongoing, Progressing  Goal: Absence of Hospital-Acquired Illness or Injury  Outcome: Ongoing, Progressing  Goal: Optimal Comfort and Wellbeing  Outcome: Ongoing, Progressing  Goal: Readiness for Transition of Care  Outcome: Ongoing, Progressing     Problem: Infection  Goal: Absence of Infection Signs and Symptoms  Outcome: Ongoing, Progressing     Problem: Pain Acute  Goal: Acceptable Pain Control and Functional Ability  Outcome: Ongoing, Progressing     Problem: Surgical Site Infection  Goal: Absence of Infection Signs and Symptoms  Outcome: Ongoing, Progressing

## 2024-02-27 NOTE — PROGRESS NOTES
Date of education: 2/27/24  Pt education type: []Pre op  [x]Post op  Surgery date: 2/27/24  Type of surgery: revisional procedure-      Education was provided on:   [x]Importance of protein and vitamin protocol  [x]Importance of drinking  fl oz/day of non carbonated sugar free non caffeinated beverages  [x]Importance of following dietary protocol  [x]Importance of ambulation postop   [x]Use of incentive spirometer 10 times an hour while awake  [x]Non opiod pain management post op   [x]Discontinuing use of meds containing aspirin, ibuprofen, NSAIDs, post op  [x]Signs and symptoms of immediate and long term complications post-op  [x]Prevention and signs and symptoms of blood clots   [x]Prevention and signs of infection  [x]Reviewed medication regimen  [x]Importance of adhering to behavioral changes  [x]Importance of following exercise protocol      Barriers to learning:  [x]None evident  []Acuity of illness  []Cognitive defects  []Cultural barriers  []Desire/Motivation  []Difficulty concentrating  []Emotional state  []Financial concerns  []Hearing deficit  []Language barrier  []Literacy  []Memory problems  []Vision impairment     Teaching methods:  []Demonstration  [x]Explanation  []Printed materials  [x]Teach back  []Virtual/web based    Expected Compliance:  [x]Good  []Fair  []Poor    Additional Notes:  Reviewed above protocols with patient and spouse. She needed minimal assistance to recite the post-op guidelines. Reiterated the importance of following the post-op dietary and behavioral guidelines to prevent complications.

## 2024-02-27 NOTE — ANESTHESIA POSTPROCEDURE EVALUATION
Anesthesia Post Evaluation    Patient: Lisa Celestin    Procedure(s) Performed: Procedure(s):  GASTROJEJUNOSTOMY, LAPAROSCOPIC    Final Anesthesia Type: general      Patient location during evaluation: PACU  Patient participation: Yes- Able to Participate  Level of consciousness: awake and alert  Post-procedure vital signs: reviewed and stable  Pain management: adequate  Airway patency: patent    PONV status at discharge: No PONV  Anesthetic complications: no      Respiratory status: unassisted  Hydration status: euvolemic  Follow-up not needed.              Vitals Value Taken Time   /79 02/26/24 1240   Temp 36.9 °C (98.4 °F) 02/26/24 0940   Pulse 84 02/26/24 1240   Resp 16 02/26/24 1240   SpO2 92 % 02/26/24 1240         Event Time   Out of Recovery 16:47:00         Pain/Vlad Score: Pain Rating Prior to Med Admin: 4 (2/26/2024  7:50 PM)  Pain Rating Post Med Admin: 0 (2/26/2024  6:15 PM)  Vlad Score: 9 (2/26/2024 11:30 AM)

## 2024-02-27 NOTE — CONSULTS
"  [facility] - 8th Floor Med Surg  Adult Nutrition  Consult Note    SUMMARY     Recommendations    Recommendation/Intervention: Pt will continue nutritional intervention and monitoring in bariatric clinic per protocol.  Goals: Understanding of dietary guidelines and tolerance to diet.  Nutrition Goal Status: progressing towards goal  Communication of RD Recs: discussed on rounds    Assessment and Plan    No new Assessment & Plan notes have been filed under this hospital service since the last note was generated.  Service: Nutrition       Malnutrition Assessment                                       Reason for Assessment    Reason For Assessment: consult  Diagnosis: other (see comments) (s/p gastrojejunostomy)  Relevant Medical History: GERD, Hx of VSG  Interdisciplinary Rounds: attended  General Information Comments: Pt reports slight discomfort when moving but tolerating bariatric clear liquids with ease. Will return and review on home diet transition prior to discharge.  Nutrition Discharge Planning: Pt to remain on bariatric clear liquids through discharge and progress diet per protocol.    Nutrition Risk Screen    Nutrition Risk Screen: no indicators present    Nutrition/Diet History    Spiritual, Cultural Beliefs, Evangelical Practices, Values that Affect Care: no    Anthropometrics    Temp: 98.1 °F (36.7 °C)  Height Method: Stated  Height: 5' 5" (165.1 cm)  Height (inches): 65 in  Weight Method: Standard Scale  Weight: 90.7 kg (200 lb)  Weight (lb): 200 lb  Ideal Body Weight (IBW), Female: 125 lb  % Ideal Body Weight, Female (lb): 160 %  BMI (Calculated): 33.3  BMI Grade: 30 - 34.9- obesity - grade I  Weight Loss: intentional  Usual Body Weight (UBW), k kg  % Usual Body Weight: 97.75  % Weight Change From Usual Weight: -2.45 %       Lab/Procedures/Meds         Physical Findings/Assessment         Estimated/Assessed Needs    Weight Used For Calorie Calculations: 71 kg (156 lb 8.4 oz)  Energy " Calorie Requirements (kcal): 1977-1628 kcals/d  Energy Need Method: Kcal/kg  Protein Requirements: 71-78g/d  Weight Used For Protein Calculations: 71 kg (156 lb 8.4 oz)  Fluid Requirements (mL): 1814 ml/d  Estimated Fluid Requirement Method: other (see comments) (20 ml/kg actual BW/d)  RDA Method (mL): 1420         Nutrition Prescription Ordered    Current Diet Order: Bariatric clear liquids  Nutrition Order Comments: Pt to remain on bariatric clears through hospital stay and discharge.    Evaluation of Received Nutrient/Fluid Intake    Energy Calories Required: not meeting needs (as expected)  Protein Required: not meeting needs (as expected)  Fluid Required: meeting needs  Tolerance: tolerating  % Intake of Estimated Energy Needs: 0 - 25 %  % Meal Intake: 0 - 25 %    Nutrition Risk    Level of Risk/Frequency of Follow-up: low       Monitor and Evaluation    Food and Nutrient Intake: energy intake  Food and Nutrient Adminstration: diet order  Knowledge/Beliefs/Attitudes: food and nutrition knowledge/skill  Anthropometric Measurements: weight, weight change, body mass index       Nutrition Follow-Up    RD Follow-up?: Yes (2w bariatric clinic follow up)

## 2024-02-27 NOTE — NURSING
Nurses Note -- 4 Eyes      2/26/2024   6:24 PM      Skin assessed during: Admit      [x] No Altered Skin Integrity Present    []Prevention Measures Documented      [] Yes- Altered Skin Integrity Present or Discovered   [] LDA Added if Not in Epic (Describe Wound)   [] New Altered Skin Integrity was Present on Admit and Documented in LDA   [] Wound Image Taken    Wound Care Consulted? No    Attending Nurse:  Vicky Grover RN/Staff Member:   eXna العراقي

## 2024-02-27 NOTE — PLAN OF CARE
02/27/24 1232   Discharge Assessment   Assessment Type Discharge Planning Assessment   Confirmed/corrected address, phone number and insurance Yes   Confirmed Demographics Correct on Facesheet   Source of Information patient   Reason For Admission gastroesphopagel reflux   People in Home spouse;child(grady), dependent   Do you expect to return to your current living situation? Yes   Do you have help at home or someone to help you manage your care at home? Yes   Who are your caregiver(s) and their phone number(s)? Pt lives with  Lj [phone]   Current cognitive status: Alert/Oriented;No Deficits   Walking or Climbing Stairs Difficulty no   Dressing/Bathing Difficulty no   Home Layout Able to live on 1st floor   Equipment Currently Used at Home none   Do you currently have service(s) that help you manage your care at home? No   Do you take prescription medications? Yes   Do you have prescription coverage? Yes   Coverage [employer]   Do you have any problems affording any of your prescribed medications? No   Is the patient taking medications as prescribed? yes   Who is going to help you get home at discharge? Lj   How do you get to doctors appointments? car, drives self   Are you on dialysis? No   Do you take coumadin? No   Discharge Plan A Home with family   Discharge Plan B Home with family   DME Needed Upon Discharge  wound care supplies   Discharge Plan discussed with: Patient   Transition of Care Barriers None     Pt states she lives with  in a 2 story home. She has 2 children ages 8 and 12 that live in the home. She states her bedroom is on the first floor. There are no steps to enter the home. Pt does not identify with discharge needs at this time.

## 2024-02-27 NOTE — PLAN OF CARE
Messa with [employer] [phone] called to offer assistance with discharge planning. No needs identified at this time.

## 2024-02-28 VITALS
WEIGHT: 200 LBS | HEART RATE: 67 BPM | BODY MASS INDEX: 33.32 KG/M2 | HEIGHT: 65 IN | OXYGEN SATURATION: 98 % | DIASTOLIC BLOOD PRESSURE: 81 MMHG | SYSTOLIC BLOOD PRESSURE: 135 MMHG | RESPIRATION RATE: 18 BRPM | TEMPERATURE: 98 F

## 2024-02-28 LAB — PSYCHE PATHOLOGY RESULT: NORMAL

## 2024-02-28 PROCEDURE — 25000003 PHARM REV CODE 250: Performed by: NURSE PRACTITIONER

## 2024-02-28 PROCEDURE — 63600175 PHARM REV CODE 636 W HCPCS: Performed by: NURSE PRACTITIONER

## 2024-02-28 RX ORDER — PROMETHAZINE HYDROCHLORIDE 12.5 MG/1
12.5 TABLET ORAL EVERY 6 HOURS PRN
Qty: 20 TABLET | Refills: 0 | Status: SHIPPED | OUTPATIENT
Start: 2024-02-28 | End: 2024-03-19

## 2024-02-28 RX ORDER — ONDANSETRON 4 MG/1
4 TABLET, ORALLY DISINTEGRATING ORAL EVERY 6 HOURS PRN
Qty: 20 TABLET | Refills: 0 | Status: SHIPPED | OUTPATIENT
Start: 2024-02-28 | End: 2024-03-19

## 2024-02-28 RX ORDER — PANTOPRAZOLE SODIUM 40 MG/1
40 TABLET, DELAYED RELEASE ORAL DAILY
Qty: 30 TABLET | Refills: 0 | Status: SHIPPED | OUTPATIENT
Start: 2024-02-28 | End: 2024-04-29

## 2024-02-28 RX ORDER — KETOROLAC TROMETHAMINE 10 MG/1
10 TABLET, FILM COATED ORAL EVERY 6 HOURS
Qty: 12 TABLET | Refills: 0 | Status: SHIPPED | OUTPATIENT
Start: 2024-02-28 | End: 2024-03-02

## 2024-02-28 RX ORDER — ACETAMINOPHEN 500 MG
1000 TABLET ORAL EVERY 8 HOURS
Qty: 18 TABLET | Refills: 0 | Status: SHIPPED | OUTPATIENT
Start: 2024-02-28 | End: 2024-03-02

## 2024-02-28 RX ADMIN — PANTOPRAZOLE SODIUM 40 MG: 40 TABLET, DELAYED RELEASE ORAL at 09:02

## 2024-02-28 RX ADMIN — MONTELUKAST 10 MG: 10 TABLET, FILM COATED ORAL at 09:02

## 2024-02-28 RX ADMIN — KETOROLAC TROMETHAMINE 30 MG: 30 INJECTION, SOLUTION INTRAMUSCULAR; INTRAVENOUS at 09:02

## 2024-02-28 RX ADMIN — KETOROLAC TROMETHAMINE 30 MG: 30 INJECTION, SOLUTION INTRAMUSCULAR; INTRAVENOUS at 02:02

## 2024-02-28 RX ADMIN — ACETAMINOPHEN 1000 MG: 500 TABLET ORAL at 07:02

## 2024-02-28 RX ADMIN — HEPARIN SODIUM 5000 UNITS: 5000 INJECTION, SOLUTION INTRAVENOUS; SUBCUTANEOUS at 09:02

## 2024-02-28 NOTE — DISCHARGE SUMMARY
[facility] - 8th Floor Med Surg  General Surgery  Discharge Summary      Patient Name: Lisa Celestin  MRN: [id number]  Admission Date: 2/26/2024  Hospital Length of Stay: 2 days  Discharge Date and Time:  02/28/2024 9:38 AM  Attending Physician: Ted Higginbotham MD   Discharging Provider: MARY Guadarrama  Primary Care Provider: Jevon Escobar MD      Admit Diagnosis:    Gastroesophageal reflux disease  2. Recurrent Hiatal Hernia  3. History of Laparoscopic Sleeve Gastrectomy and Laparoscopic hiatal hernia repair in 2021  4. Grade D esophagitis  5. Ineffective esophageal motility  6. Obesity BMI 33.35     Procedure: Laparoscopic repair of recurrent hiatal hernia, Laparoscopic gastrojejunostomy with Jerrica-en-Y reconstruction, KIRILL block 2/26/24     Hospital Course:  Ms. Lisa Celestin is a 39 y.o. female who was admitted for an elective bariatric procedure. Procedure performed as stated above. Upon completion of procedure, pt was transferred from the recovery room to the post surgical floor for further care and treatment. POD#1, afebrile, vital signs stable. The pt's diet was advanced to bariatric clear liquids. POD# 2 , tolerating bariatric clears. Ambulating and voiding.      Review of Systems: Mild incisional pain reported but tolerable. No nausea, vomiting, dysphagia, GERD. Patient ambulating in the room/hallway and voiding without difficulty. Pt denies any dizziness, palpitations, SOB, or CP. All other review of systems are negative.      Physical Examination:   Vital signs: stable, noted in chart. AFVSS  General: Awake and alert, able to answer all questions. Resting in bed with family member at bedside  Respiratory:  Clear to auscultation bilaterally  Cardio: Regular rate and rhythm.  Abdomen: Soft, non distended. Bowel sounds present to all 4 quadrants. Lap sites clean, dry, and intact. Appropriate point tenderness to lap sites. Abdomen benign. BOBY drain to left mid abdomen with  SS/thin bloody drainage, penrose drain to LUQ intact.   Neuro: Alert and oriented x's 4.     BOBY output x 24 hours : 30 cc documented x 24 hours     Labs:  no labs today    Consults:   Consults (From admission, onward)          Status Ordering Provider     Inpatient consult to Registered Dietitian/Nutritionist  Once        Provider:  (Not yet assigned)    Completed MEENAKSHI TRENT            Significant Diagnostic Studies: Labs: BMP:   Recent Labs   Lab 02/27/24  0711      K 4.2   CO2 22   BUN 8.8   CREATININE 0.56   CALCIUM 8.2*    and CBC   Recent Labs   Lab 02/27/24  0711   WBC 8.12   HGB 12.6   HCT 37.3              Pending Diagnostic Studies:       Procedure Component Value Units Date/Time    Specimen to Pathology [[id number]] Collected: 02/26/24 0929    Order Status: Sent Lab Status: In process Updated: 02/26/24 1505    Specimen: Tissue from Intestine           Final Active Diagnoses:    Diagnosis Date Noted POA    PRINCIPAL PROBLEM:  Gastroesophageal reflux disease with esophagitis without hemorrhage [K21.00] 02/08/2024 Yes    History of sleeve gastrectomy [Z90.3] 02/14/2024 Not Applicable    Hiatal hernia [K44.9] 02/08/2024 Yes    Esophageal dysmotility [K22.4] 02/08/2024 Yes      Problems Resolved During this Admission:      Discharged Condition: good    Disposition: Home or Self Care    Follow Up:   Follow-up Information       Ted Higginbotham MD Follow up in 2 week(s).    Specialty: General Surgery  Contact information:  [address] [id number] [phone]                           Patient Instructions:   No discharge procedures on file.  Medications:  Reconciled Home Medications:      Medication List        START taking these medications      acetaminophen 500 MG tablet  Commonly known as: TYLENOL  Take 2 tablets (1,000 mg total) by mouth every 8 (eight) hours. for 3 days     ketorolac 10 mg tablet  Commonly known as: TORADOL  Take 1 tablet (10 mg total) by mouth  every 6 (six) hours. for 3 days     ondansetron 4 MG Tbdl  Commonly known as: ZOFRAN-ODT  Take 1 tablet (4 mg total) by mouth every 6 (six) hours as needed (first line med PRN NV).     promethazine 12.5 MG Tab  Commonly known as: PHENERGAN  Take 1 tablet (12.5 mg total) by mouth every 6 (six) hours as needed (2nd line med PRN NV).            CHANGE how you take these medications      pantoprazole 40 MG tablet  Commonly known as: PROTONIX  Take 1 tablet (40 mg total) by mouth once daily. Take daily for first 30 days post op. DC Nexium while on this med  What changed: additional instructions            CONTINUE taking these medications      chlorpheniramine 4 mg tablet  Commonly known as: CHLOR-TRIMETON  Take 4 mg by mouth every 6 (six) hours as needed for Allergies.     cyproheptadine 4 mg tablet  Commonly known as: PERIACTIN  Take 4 mg by mouth 2 (two) times a day.     famotidine 40 MG tablet  Commonly known as: PEPCID  Take 40 mg by mouth every evening.     melatonin 5 mg Cap  Take by mouth.     montelukast 10 mg tablet  Commonly known as: SINGULAIR  Take 10 mg by mouth once daily.     PROBIOTIC-10 ORAL  Take 1 capsule by mouth once daily.     traZODone 100 MG tablet  Commonly known as: DESYREL  Take 100 mg by mouth every evening.     VRAYLAR 1.5 mg Cap  Generic drug: cariprazine  Take 1.5 mg by mouth Daily.            STOP taking these medications      esomeprazole 40 MG capsule  Commonly known as: NEXIUM     lisdexamfetamine 50 MG capsule  Commonly known as: VYVANSE          POD# 2 Lap RNY GBP/HH repair    DC home   Remove BOBY drain and penrose drain  Discharge instructions given to pt  RX sent for DC  FU 2 weeks in office  Discharge instruction given to patient    MARY Guadarrama  General Surgery  [facility] - 8th Floor Med Surg

## 2024-02-28 NOTE — PLAN OF CARE
Problem: Adult Inpatient Plan of Care  Goal: Plan of Care Review  Outcome: Ongoing, Progressing  Goal: Patient-Specific Goal (Individualized)  Outcome: Ongoing, Progressing  Goal: Absence of Hospital-Acquired Illness or Injury  Outcome: Ongoing, Progressing  Goal: Optimal Comfort and Wellbeing  Outcome: Ongoing, Progressing  Goal: Readiness for Transition of Care  Outcome: Ongoing, Progressing     Problem: Infection  Goal: Absence of Infection Signs and Symptoms  Outcome: Ongoing, Progressing     Problem: Pain Acute  Goal: Acceptable Pain Control and Functional Ability  Outcome: Ongoing, Progressing     Problem: Surgical Site Infection  Goal: Absence of Infection Signs and Symptoms  Outcome: Ongoing, Progressing     Problem: Fall Injury Risk  Goal: Absence of Fall and Fall-Related Injury  Outcome: Ongoing, Progressing

## 2024-02-28 NOTE — PROGRESS NOTES
Reviewed nutritional guidelines for home discharge with pt today, which included adequate water intake, protein supplementation and micronutrient supplementation regimen. Pt also advised on signs and symptoms of possible complications. Pt expressed understanding. Will follow up with pt in 2w in bariatric clinic.

## 2024-02-28 NOTE — PLAN OF CARE
D/C order noted- no services requested.  Nurse Radha states that pt does not require HH services.  She is independent and has good support at home from spouse.

## 2024-03-01 ENCOUNTER — TELEPHONE (OUTPATIENT)
Dept: SURGERY | Facility: CLINIC | Age: 39
End: 2024-03-01
Payer: COMMERCIAL

## 2024-03-01 NOTE — TELEPHONE ENCOUNTER
----- Message from Jacinta Piña MA sent at 3/1/2024 12:29 PM CST -----  Regarding: Pt call  Pt left a vm with post op questions  Lap gastrj 2/26/24

## 2024-03-05 ENCOUNTER — TELEPHONE (OUTPATIENT)
Dept: SURGERY | Facility: CLINIC | Age: 39
End: 2024-03-05
Payer: COMMERCIAL

## 2024-03-05 ENCOUNTER — TELEPHONE (OUTPATIENT)
Dept: BARIATRICS | Facility: HOSPITAL | Age: 39
End: 2024-03-05
Payer: COMMERCIAL

## 2024-03-05 DIAGNOSIS — Z98.84 HISTORY OF BARIATRIC SURGERY: Primary | ICD-10-CM

## 2024-03-05 NOTE — TELEPHONE ENCOUNTER
Date call was completed: 3/5/24  Date of Surgery: 2/26/24  Surgery type: sleeve gastrectomy    Are you drinking the recommended amount of water (73-100oz) a day? []  Yes        [x]  No  If not, how may ounces of water are you drinking? 50-60 fl oz/d    Are you drinking the recommended amount of protein a day?(recommendations base on individual goal) [x]  Yes        []  No  If not, how many grams of protein are you drinking?    Are you taking the recommended supplements that were discussed in pre-op class?  []  Yes   [x] No  Multivitamin [x]  Yes        []  No  Calcium Supplement [x]  Yes        []  No Not taking consistently 3xd  Iron [x]  Yes        []  No  Miralax [x]  Yes        []  No    Are you walking at least 20 minutes a day for exercise? [x]  Yes   [] No    Have you resumed your home medications that you were instructed to resume? [x]  Yes   [] No    Do you have a follow-up appt scheduled with your family doctor or doctor treating you for you co-morb conditions within the week? []  Yes   [x] No    Are you taking the Protonix (at night) that was prescribed to you in the hospital? [x]  Yes   [] No    Are you showering daily with an antibacterial soap?  [x]  Yes   [] No    Have you had a bowel movement since surgery? [x]  Yes   [] No

## 2024-03-05 NOTE — TELEPHONE ENCOUNTER
Surgeon:  Dr. Ted Higginbotham   Procedure:  Gastroj   Procedure Date:  02/26/24  Patient Concerns:  pt called stating that where her drain was it is now draining a light reddish fluid, at times it is bright   No signs of infection, she is packing site, but it is very tender, no odor no redness, she feels like it looks good   Pt does have an appt on 03/12/24     She didn't feel like it was necessary to send a photo     JOVANNAI       Advised pt to continue to pack wound, it will heal from the inside out, keep appt, try to keep area dry, change gauze as needed, watch for any signs of infection call if needed

## 2024-03-06 NOTE — TELEPHONE ENCOUNTER
I called patient. Pt was mostly concerned about the drainage that she was still having. It is serosanguinous and is just on the gauze. It is not saturating anything and she is just needing to change and pack it once daily. No increased pain, just having pain in that area with movement. Reassured her that it is normal at this time and ok to f/u on Monday

## 2024-03-08 NOTE — PROGRESS NOTES
Please inform patient surgical pathology results were normal (benign). No additional workup required at this time. Please keep routine post op appointment as scheduled.

## 2024-03-11 ENCOUNTER — HOSPITAL ENCOUNTER (INPATIENT)
Facility: HOSPITAL | Age: 39
LOS: 3 days | Discharge: HOME | DRG: 390 | End: 2024-03-14
Attending: SURGERY | Admitting: SURGERY
Payer: COMMERCIAL

## 2024-03-11 ENCOUNTER — TELEPHONE (OUTPATIENT)
Dept: SURGERY | Facility: CLINIC | Age: 39
End: 2024-03-11
Payer: COMMERCIAL

## 2024-03-11 ENCOUNTER — PATIENT MESSAGE (OUTPATIENT)
Dept: SURGERY | Facility: CLINIC | Age: 39
End: 2024-03-11
Payer: COMMERCIAL

## 2024-03-11 DIAGNOSIS — Z98.0 STATUS POST BYPASS GASTROJEJUNOSTOMY: ICD-10-CM

## 2024-03-11 DIAGNOSIS — K56.609 BOWEL OBSTRUCTION: ICD-10-CM

## 2024-03-11 DIAGNOSIS — K56.609 SMALL BOWEL OBSTRUCTION: ICD-10-CM

## 2024-03-11 LAB
ALBUMIN SERPL-MCNC: 4.8 G/DL (ref 3.5–5)
ALBUMIN/GLOB SERPL: 1.8 RATIO (ref 1.1–2)
ALP SERPL-CCNC: 77 UNIT/L (ref 40–150)
ALT SERPL-CCNC: 21 UNIT/L (ref 0–55)
APPEARANCE UR: CLEAR
AST SERPL-CCNC: 15 UNIT/L (ref 5–34)
BACTERIA #/AREA URNS AUTO: ABNORMAL /HPF
BASOPHILS # BLD AUTO: 0.16 X10(3)/MCL
BASOPHILS NFR BLD AUTO: 1.3 %
BILIRUB SERPL-MCNC: 0.6 MG/DL
BILIRUB UR QL STRIP.AUTO: NEGATIVE
BUN SERPL-MCNC: 14 MG/DL (ref 7–18.7)
CALCIUM SERPL-MCNC: 9.9 MG/DL (ref 8.4–10.2)
CHLORIDE SERPL-SCNC: 107 MMOL/L (ref 98–107)
CO2 SERPL-SCNC: 22 MMOL/L (ref 22–29)
COLOR UR AUTO: ABNORMAL
CREAT SERPL-MCNC: 0.69 MG/DL (ref 0.55–1.02)
EOSINOPHIL # BLD AUTO: 0.14 X10(3)/MCL (ref 0–0.9)
EOSINOPHIL NFR BLD AUTO: 1.1 %
ERYTHROCYTE [DISTWIDTH] IN BLOOD BY AUTOMATED COUNT: 13.1 % (ref 11.5–17)
GFR SERPLBLD CREATININE-BSD FMLA CKD-EPI: >60 MLS/MIN/1.73/M2
GLOBULIN SER-MCNC: 2.7 GM/DL (ref 2.4–3.5)
GLUCOSE SERPL-MCNC: 121 MG/DL (ref 74–100)
GLUCOSE UR QL STRIP.AUTO: NORMAL
HCT VFR BLD AUTO: 46.6 % (ref 37–47)
HGB BLD-MCNC: 16.5 G/DL (ref 12–16)
IMM GRANULOCYTES # BLD AUTO: 0.08 X10(3)/MCL (ref 0–0.04)
IMM GRANULOCYTES NFR BLD AUTO: 0.6 %
KETONES UR QL STRIP.AUTO: ABNORMAL
LEUKOCYTE ESTERASE UR QL STRIP.AUTO: NEGATIVE
LIPASE SERPL-CCNC: 42 U/L
LYMPHOCYTES # BLD AUTO: 0.68 X10(3)/MCL (ref 0.6–4.6)
LYMPHOCYTES NFR BLD AUTO: 5.4 %
MCH RBC QN AUTO: 31.1 PG (ref 27–31)
MCHC RBC AUTO-ENTMCNC: 35.4 G/DL (ref 33–36)
MCV RBC AUTO: 87.8 FL (ref 80–94)
MONOCYTES # BLD AUTO: 0.36 X10(3)/MCL (ref 0.1–1.3)
MONOCYTES NFR BLD AUTO: 2.9 %
MUCOUS THREADS URNS QL MICRO: ABNORMAL /LPF
NEUTROPHILS # BLD AUTO: 11.21 X10(3)/MCL (ref 2.1–9.2)
NEUTROPHILS NFR BLD AUTO: 88.7 %
NITRITE UR QL STRIP.AUTO: NEGATIVE
NRBC BLD AUTO-RTO: 0 %
PH UR STRIP.AUTO: 6 [PH]
PLATELET # BLD AUTO: 373 X10(3)/MCL (ref 130–400)
PMV BLD AUTO: 9.7 FL (ref 7.4–10.4)
POTASSIUM SERPL-SCNC: 4.7 MMOL/L (ref 3.5–5.1)
PROT SERPL-MCNC: 7.5 GM/DL (ref 6.4–8.3)
PROT UR QL STRIP.AUTO: ABNORMAL
RBC # BLD AUTO: 5.31 X10(6)/MCL (ref 4.2–5.4)
RBC #/AREA URNS AUTO: ABNORMAL /HPF
RBC UR QL AUTO: NEGATIVE
SODIUM SERPL-SCNC: 140 MMOL/L (ref 136–145)
SP GR UR STRIP.AUTO: >1.05 (ref 1–1.03)
SQUAMOUS #/AREA URNS LPF: ABNORMAL /HPF
UROBILINOGEN UR STRIP-ACNC: NORMAL
WBC # SPEC AUTO: 12.63 X10(3)/MCL (ref 4.5–11.5)
WBC #/AREA URNS AUTO: ABNORMAL /HPF

## 2024-03-11 PROCEDURE — 63600175 PHARM REV CODE 636 W HCPCS

## 2024-03-11 PROCEDURE — 25000003 PHARM REV CODE 250: Performed by: SURGERY

## 2024-03-11 PROCEDURE — 11000001 HC ACUTE MED/SURG PRIVATE ROOM

## 2024-03-11 PROCEDURE — 99285 EMERGENCY DEPT VISIT HI MDM: CPT | Mod: 25

## 2024-03-11 PROCEDURE — 63600175 PHARM REV CODE 636 W HCPCS: Performed by: SURGERY

## 2024-03-11 PROCEDURE — 25500020 PHARM REV CODE 255

## 2024-03-11 PROCEDURE — 80053 COMPREHEN METABOLIC PANEL: CPT | Performed by: PHYSICIAN ASSISTANT

## 2024-03-11 PROCEDURE — 83690 ASSAY OF LIPASE: CPT | Performed by: PHYSICIAN ASSISTANT

## 2024-03-11 PROCEDURE — 96374 THER/PROPH/DIAG INJ IV PUSH: CPT

## 2024-03-11 PROCEDURE — 85025 COMPLETE CBC W/AUTO DIFF WBC: CPT | Performed by: PHYSICIAN ASSISTANT

## 2024-03-11 PROCEDURE — 81001 URINALYSIS AUTO W/SCOPE: CPT | Performed by: PHYSICIAN ASSISTANT

## 2024-03-11 PROCEDURE — 63600175 PHARM REV CODE 636 W HCPCS: Performed by: EMERGENCY MEDICINE

## 2024-03-11 RX ORDER — HYDROMORPHONE HYDROCHLORIDE 2 MG/ML
0.5 INJECTION, SOLUTION INTRAMUSCULAR; INTRAVENOUS; SUBCUTANEOUS EVERY 4 HOURS PRN
Status: DISCONTINUED | OUTPATIENT
Start: 2024-03-11 | End: 2024-03-13

## 2024-03-11 RX ORDER — TALC
6 POWDER (GRAM) TOPICAL NIGHTLY PRN
Status: DISCONTINUED | OUTPATIENT
Start: 2024-03-11 | End: 2024-03-14 | Stop reason: HOSPADM

## 2024-03-11 RX ORDER — HYDROMORPHONE HYDROCHLORIDE 2 MG/ML
1 INJECTION, SOLUTION INTRAMUSCULAR; INTRAVENOUS; SUBCUTANEOUS
Status: COMPLETED | OUTPATIENT
Start: 2024-03-11 | End: 2024-03-11

## 2024-03-11 RX ORDER — ONDANSETRON HYDROCHLORIDE 2 MG/ML
4 INJECTION, SOLUTION INTRAVENOUS
Status: COMPLETED | OUTPATIENT
Start: 2024-03-11 | End: 2024-03-11

## 2024-03-11 RX ORDER — SUCRALFATE 1 G/1
1 TABLET ORAL 4 TIMES DAILY
Qty: 56 TABLET | Refills: 0 | Status: SHIPPED | OUTPATIENT
Start: 2024-03-11 | End: 2024-03-19

## 2024-03-11 RX ORDER — SODIUM CHLORIDE 0.9 % (FLUSH) 0.9 %
10 SYRINGE (ML) INJECTION
Status: DISCONTINUED | OUTPATIENT
Start: 2024-03-11 | End: 2024-03-12

## 2024-03-11 RX ORDER — ONDANSETRON HYDROCHLORIDE 2 MG/ML
8 INJECTION, SOLUTION INTRAVENOUS
Status: COMPLETED | OUTPATIENT
Start: 2024-03-11 | End: 2024-03-11

## 2024-03-11 RX ORDER — DEXTROSE MONOHYDRATE AND SODIUM CHLORIDE 5; .9 G/100ML; G/100ML
INJECTION, SOLUTION INTRAVENOUS CONTINUOUS
Status: DISCONTINUED | OUTPATIENT
Start: 2024-03-11 | End: 2024-03-12

## 2024-03-11 RX ORDER — FAMOTIDINE 10 MG/ML
20 INJECTION INTRAVENOUS EVERY 12 HOURS
Status: DISCONTINUED | OUTPATIENT
Start: 2024-03-11 | End: 2024-03-14 | Stop reason: HOSPADM

## 2024-03-11 RX ORDER — ONDANSETRON HYDROCHLORIDE 2 MG/ML
4 INJECTION, SOLUTION INTRAVENOUS EVERY 8 HOURS PRN
Status: DISCONTINUED | OUTPATIENT
Start: 2024-03-11 | End: 2024-03-14 | Stop reason: HOSPADM

## 2024-03-11 RX ADMIN — DEXTROSE AND SODIUM CHLORIDE: 5; 900 INJECTION, SOLUTION INTRAVENOUS at 11:03

## 2024-03-11 RX ADMIN — HYDROMORPHONE HYDROCHLORIDE 1 MG: 2 INJECTION INTRAMUSCULAR; INTRAVENOUS; SUBCUTANEOUS at 09:03

## 2024-03-11 RX ADMIN — IOHEXOL 100 ML: 350 INJECTION, SOLUTION INTRAVENOUS at 08:03

## 2024-03-11 RX ADMIN — DEXTROSE AND SODIUM CHLORIDE: 5; 900 INJECTION, SOLUTION INTRAVENOUS at 09:03

## 2024-03-11 RX ADMIN — ONDANSETRON 8 MG: 2 INJECTION INTRAMUSCULAR; INTRAVENOUS at 09:03

## 2024-03-11 RX ADMIN — ONDANSETRON 4 MG: 2 INJECTION INTRAMUSCULAR; INTRAVENOUS at 07:03

## 2024-03-11 RX ADMIN — FAMOTIDINE 20 MG: 10 INJECTION, SOLUTION INTRAVENOUS at 09:03

## 2024-03-11 NOTE — TELEPHONE ENCOUNTER
Post op bariatric phone call:    Pt called office @ [phone] with complaints of inability to drink water and still having nausea, dry heaves, and waves of abdominal pain since this AM.  Patient spoke with Soraya Guerra NP a few hours ago and she recommended returning to clear liquids.     POD 14 SP Laparoscopic repair of recurrent hiatal hernia, Laparoscopic gastrojejunostomy with Jerrica-en-Y reconstruction, KIRILL block 2/26/24    Ate potato salad on POD 11, had abd pain, NV. Attempted oatmeal on POD 13 and symptoms recurred.     Today she woke up and is unable to keep down anything. Pain comes and goes in waves to upper mid abdomen. Having dry heaves and nausea. Took a sip of water with Phenergan and this came up.     Advised pt to sip water every few minutes to try to hydrate and have GI rest from solids. Explained that if unable to tolerate a sip of water every few minutes, having NV, and abd pain, she needs to present to ED for further workup. Explained that advancing diet too quickly especially with starches could have caused a staple line dehiscence. Pt states she will try to sip water and if symptoms continue, she will go to the ED.     Clinical team updated.     KIM Arshad NP / Dr. Higginbotham

## 2024-03-11 NOTE — TELEPHONE ENCOUNTER
recommending Maalox, pt believes she has a stomach ulcer she does have an appt with a GI physician in [place] in a few weeks   Setting a reminder to fu on the status of patient

## 2024-03-11 NOTE — PROGRESS NOTES
Called by patient  that patient has presented to ED.    I instructed patients  that I will follow work up and come to see patient after results of evaluation.

## 2024-03-11 NOTE — FIRST PROVIDER EVALUATION
"Medical screening examination initiated.  I have conducted a focused provider triage encounter, findings are as follows:    Chief Complaint   Patient presents with    Abdominal Pain     Post op gastric sleeve revision x 14 days. Severe abdominal pain with nausea, chills, and vomiting x 3 days. Dr Higginbotham advised pt to come to ER     Brief history of present illness:  39 y.o. female presents to the ED with abdominal pain onset 3 days with n/v and chills. Had gastric sleeve done 2 weeks ago with Dr Richardson    Vitals:    03/11/24 1709 03/11/24 1710   BP: (!) 138/100    Pulse: (!) 120    Resp: (!) 22    Temp:  98.2 °F (36.8 °C)   TempSrc:  Oral   SpO2: 99%    Weight: 86.6 kg (191 lb)    Height: 5' 5" (1.651 m)        Pertinent physical exam:  Awake, alert, ambulatory, non-labored respirations    Brief workup plan:  labs, UA    Preliminary workup initiated; this workup will be continued and followed by the physician or advanced practice provider that is assigned to the patient when roomed.  "

## 2024-03-12 LAB
ALBUMIN SERPL-MCNC: 3.7 G/DL (ref 3.5–5)
ALBUMIN/GLOB SERPL: 1.4 RATIO (ref 1.1–2)
ALP SERPL-CCNC: 61 UNIT/L (ref 40–150)
ALT SERPL-CCNC: 17 UNIT/L (ref 0–55)
AST SERPL-CCNC: 13 UNIT/L (ref 5–34)
BASOPHILS # BLD AUTO: 0.11 X10(3)/MCL
BASOPHILS NFR BLD AUTO: 1.5 %
BILIRUB SERPL-MCNC: 0.4 MG/DL
BUN SERPL-MCNC: 13.3 MG/DL (ref 7–18.7)
CALCIUM SERPL-MCNC: 8.6 MG/DL (ref 8.4–10.2)
CHLORIDE SERPL-SCNC: 112 MMOL/L (ref 98–107)
CO2 SERPL-SCNC: 21 MMOL/L (ref 22–29)
CREAT SERPL-MCNC: 0.62 MG/DL (ref 0.55–1.02)
EOSINOPHIL # BLD AUTO: 0.25 X10(3)/MCL (ref 0–0.9)
EOSINOPHIL NFR BLD AUTO: 3.4 %
ERYTHROCYTE [DISTWIDTH] IN BLOOD BY AUTOMATED COUNT: 13.2 % (ref 11.5–17)
GFR SERPLBLD CREATININE-BSD FMLA CKD-EPI: >60 MLS/MIN/1.73/M2
GLOBULIN SER-MCNC: 2.6 GM/DL (ref 2.4–3.5)
GLUCOSE SERPL-MCNC: 119 MG/DL (ref 74–100)
HCT VFR BLD AUTO: 40.5 % (ref 37–47)
HGB BLD-MCNC: 14.1 G/DL (ref 12–16)
IMM GRANULOCYTES # BLD AUTO: 0.05 X10(3)/MCL (ref 0–0.04)
IMM GRANULOCYTES NFR BLD AUTO: 0.7 %
LYMPHOCYTES # BLD AUTO: 1.46 X10(3)/MCL (ref 0.6–4.6)
LYMPHOCYTES NFR BLD AUTO: 19.6 %
MCH RBC QN AUTO: 30.9 PG (ref 27–31)
MCHC RBC AUTO-ENTMCNC: 34.8 G/DL (ref 33–36)
MCV RBC AUTO: 88.8 FL (ref 80–94)
MONOCYTES # BLD AUTO: 0.66 X10(3)/MCL (ref 0.1–1.3)
MONOCYTES NFR BLD AUTO: 8.9 %
NEUTROPHILS # BLD AUTO: 4.92 X10(3)/MCL (ref 2.1–9.2)
NEUTROPHILS NFR BLD AUTO: 65.9 %
NRBC BLD AUTO-RTO: 0 %
PLATELET # BLD AUTO: 264 X10(3)/MCL (ref 130–400)
PLATELETS.RETICULATED NFR BLD AUTO: 6.6 % (ref 0.9–11.2)
PMV BLD AUTO: 10.6 FL (ref 7.4–10.4)
POTASSIUM SERPL-SCNC: 3.8 MMOL/L (ref 3.5–5.1)
PROT SERPL-MCNC: 6.3 GM/DL (ref 6.4–8.3)
RBC # BLD AUTO: 4.56 X10(6)/MCL (ref 4.2–5.4)
SODIUM SERPL-SCNC: 140 MMOL/L (ref 136–145)
WBC # SPEC AUTO: 7.45 X10(3)/MCL (ref 4.5–11.5)

## 2024-03-12 PROCEDURE — 63600175 PHARM REV CODE 636 W HCPCS: Performed by: SURGERY

## 2024-03-12 PROCEDURE — 85025 COMPLETE CBC W/AUTO DIFF WBC: CPT | Performed by: SURGERY

## 2024-03-12 PROCEDURE — 63600175 PHARM REV CODE 636 W HCPCS: Performed by: NURSE PRACTITIONER

## 2024-03-12 PROCEDURE — 80053 COMPREHEN METABOLIC PANEL: CPT | Performed by: SURGERY

## 2024-03-12 PROCEDURE — 11000001 HC ACUTE MED/SURG PRIVATE ROOM

## 2024-03-12 PROCEDURE — 25000003 PHARM REV CODE 250: Performed by: SURGERY

## 2024-03-12 RX ORDER — SODIUM CHLORIDE, SODIUM LACTATE, POTASSIUM CHLORIDE, CALCIUM CHLORIDE 600; 310; 30; 20 MG/100ML; MG/100ML; MG/100ML; MG/100ML
INJECTION, SOLUTION INTRAVENOUS CONTINUOUS
Status: DISCONTINUED | OUTPATIENT
Start: 2024-03-12 | End: 2024-03-14 | Stop reason: HOSPADM

## 2024-03-12 RX ORDER — ENOXAPARIN SODIUM 100 MG/ML
40 INJECTION SUBCUTANEOUS EVERY 24 HOURS
Status: DISCONTINUED | OUTPATIENT
Start: 2024-03-12 | End: 2024-03-14 | Stop reason: HOSPADM

## 2024-03-12 RX ADMIN — FAMOTIDINE 20 MG: 10 INJECTION, SOLUTION INTRAVENOUS at 09:03

## 2024-03-12 RX ADMIN — HYDROMORPHONE HYDROCHLORIDE 0.5 MG: 2 INJECTION INTRAMUSCULAR; INTRAVENOUS; SUBCUTANEOUS at 03:03

## 2024-03-12 RX ADMIN — HYDROMORPHONE HYDROCHLORIDE 0.5 MG: 2 INJECTION INTRAMUSCULAR; INTRAVENOUS; SUBCUTANEOUS at 02:03

## 2024-03-12 RX ADMIN — FOLIC ACID: 5 INJECTION, SOLUTION INTRAMUSCULAR; INTRAVENOUS; SUBCUTANEOUS at 09:03

## 2024-03-12 RX ADMIN — ONDANSETRON 4 MG: 2 INJECTION INTRAMUSCULAR; INTRAVENOUS at 08:03

## 2024-03-12 RX ADMIN — SODIUM CHLORIDE, POTASSIUM CHLORIDE, SODIUM LACTATE AND CALCIUM CHLORIDE: 600; 310; 30; 20 INJECTION, SOLUTION INTRAVENOUS at 02:03

## 2024-03-12 RX ADMIN — FAMOTIDINE 20 MG: 10 INJECTION, SOLUTION INTRAVENOUS at 08:03

## 2024-03-12 RX ADMIN — HYDROMORPHONE HYDROCHLORIDE 0.5 MG: 2 INJECTION INTRAMUSCULAR; INTRAVENOUS; SUBCUTANEOUS at 10:03

## 2024-03-12 RX ADMIN — ENOXAPARIN SODIUM 40 MG: 40 INJECTION SUBCUTANEOUS at 04:03

## 2024-03-12 RX ADMIN — HYDROMORPHONE HYDROCHLORIDE 0.5 MG: 2 INJECTION INTRAMUSCULAR; INTRAVENOUS; SUBCUTANEOUS at 07:03

## 2024-03-12 RX ADMIN — ONDANSETRON 4 MG: 2 INJECTION INTRAMUSCULAR; INTRAVENOUS at 07:03

## 2024-03-12 RX ADMIN — DEXTROSE AND SODIUM CHLORIDE: 5; 900 INJECTION, SOLUTION INTRAVENOUS at 06:03

## 2024-03-12 NOTE — ED PROVIDER NOTES
Encounter Date: 3/11/2024       History     Chief Complaint   Patient presents with    Abdominal Pain     Post op gastric sleeve revision x 14 days. Severe abdominal pain with nausea, chills, and vomiting x 3 days. Dr Higginbotham advised pt to come to ER     40 y/o 14 days s/p gastric sleeve revision w/ Dr. Higginbotham presents w/ severe abdominal pain w/ associated nausea. Reports + BM. Denies any hematochezia or melena. Denies any fever or chills. She contacted her surgeon who advised coming to ED for evaluation.     The history is provided by the patient and medical records.     Review of patient's allergies indicates:  No Known Allergies  Past Medical History:   Diagnosis Date    ADHD     Allergies     Anxiety     Depression     Gastroesophageal reflux disease, unspecified whether esophagitis present     GERD (gastroesophageal reflux disease)     Headache due to anesthesia     History of stomach ulcers     IBS (irritable bowel syndrome)     Irregular heart beat     [place] grade D esophagitis     Nausea & vomiting     Obesity     PONV (postoperative nausea and vomiting)      Past Surgical History:   Procedure Laterality Date     SECTION      X2    CHOLECYSTECTOMY  10/03/2012    COLONOSCOPY      ESOPHAGOGASTRODUODENOSCOPY      a few    GASTROJEJUNOSTOMY, LAPAROSCOPIC  2024    Procedure: GASTROJEJUNOSTOMY, LAPAROSCOPIC;  Surgeon: Ted Higginbotham MD;  Location: [facility];  Service: General;;  WITH AUTIN    HERNIA REPAIR      HYSTERECTOMY      LAPAROSCOPIC SLEEVE GASTRECTOMY  2021    Dr. Higginbotham     Family History   Problem Relation Age of Onset    Hypertension Maternal Grandmother      Social History     Tobacco Use    Smoking status: Never    Smokeless tobacco: Never   Substance Use Topics    Alcohol use: Not Currently    Drug use: Never     Review of Systems   Constitutional:  Negative for fever.   Respiratory:  Negative for shortness of breath.    Gastrointestinal:  Positive for abdominal  pain, nausea and vomiting.       Physical Exam     Initial Vitals   BP Pulse Resp Temp SpO2   03/11/24 1709 03/11/24 1709 03/11/24 1709 03/11/24 1710 03/11/24 1709   (!) 138/100 (!) 120 (!) 22 98.2 °F (36.8 °C) 99 %      MAP       --                Physical Exam    Nursing note and vitals reviewed.  Constitutional: She appears well-developed and well-nourished.   HENT:   Mouth/Throat: Oropharynx is clear and moist.   Cardiovascular:  Normal rate and regular rhythm.           Pulmonary/Chest: No respiratory distress.   Abdominal: She exhibits no distension. There is abdominal tenderness (mild, generalized). There is no rebound and no guarding.     Neurological: She is alert and oriented to person, place, and time.   Skin: Skin is warm and dry. No rash noted. No pallor.         ED Course   Procedures  Labs Reviewed   COMPREHENSIVE METABOLIC PANEL - Abnormal; Notable for the following components:       Result Value    Glucose Level 121 (*)     All other components within normal limits   URINALYSIS, REFLEX TO URINE CULTURE - Abnormal; Notable for the following components:    Specific Gravity, UA >1.050 (*)     Protein, UA 1+ (*)     Ketones, UA 4+ (*)     Squamous Epithelial Cells, UA Occasional (*)     Mucous, UA Trace (*)     All other components within normal limits   CBC WITH DIFFERENTIAL - Abnormal; Notable for the following components:    WBC 12.63 (*)     Hgb 16.5 (*)     MCH 31.1 (*)     Neut # 11.21 (*)     IG# 0.08 (*)     All other components within normal limits   LIPASE - Normal   CBC W/ AUTO DIFFERENTIAL    Narrative:     The following orders were created for panel order CBC auto differential.  Procedure                               Abnormality         Status                     ---------                               -----------         ------                     CBC with Differential[[id number]]       Abnormal            Final result                 Please view results for these tests on the individual  orders.          Imaging Results              CT Abdomen Pelvis With IV Contrast NO Oral Contrast (Final result)  Result time 03/12/24 08:27:52      Final result by Monica Barajas MD (03/12/24 08:27:52)                   Impression:      1. Postsurgical changes of gastric bypass with distension of the Jerrica limb to the level of the jejunojejunostomy.  Transition point at the anastomosis.  Findings concerning for obstruction.  2. The preliminary and final reports are concordant.      Electronically signed by: Monica Barajas  Date:    03/12/2024  Time:    08:27               Narrative:    EXAMINATION:  CT ABDOMEN PELVIS WITH IV CONTRAST    CLINICAL HISTORY:  Abdominal pain, post-op;Nausea/vomiting;post laparoscopic gastrojejunostomy procedure;    TECHNIQUE:  Helically acquired images with axial, sagittal and coronal reformations were obtained from the lung bases to the pubic symphysis after the IV administration of contrast.    Automated tube current modulation, weight-based exposure dosing, and/or iterative reconstruction technique utilized to reach lowest reasonably achievable exposure rate.    DLP: 880 mGy*cm    COMPARISON:  No relevant prior    FINDINGS:  HEART: Normal in size. No pericardial effusion.    LUNG BASES: Well aerated.    LIVER: The liver is hypodense.  Probable focal fatty infiltration adjacent to the falciform ligament.    BILIARY: Gallbladder is surgically absent.    PANCREAS: No inflammatory change.    SPLEEN: Splenomegaly.    ADRENALS: No mass.    KIDNEYS/URETERS: The kidneys enhance symmetrically.  No hydronephrosis. Probable tiny right renal cortical cysts.    GI TRACT/MESENTERY: There are postsurgical changes of gastric bypass.  The Jerrica limb is distended measuring up to 3.9 cm in diameter with mild associated edema.  There is caliber change at the jejunojejunostomy.  No evidence of bowel obstruction or inflammation.    PERITONEUM: Small volume pelvic free fluid.No free air.    LYMPH  NODES: No enlarged lymph nodes by size criteria.    VASCULATURE: No significant atherosclerosis or aneurysm.    BLADDER: Normal appearance given degree of distention.    REPRODUCTIVE ORGANS: Normal as visualized.    SOFT TISSUES: Small fat containing umbilical hernia.    BONES: No acute osseous abnormality.                        Preliminary result by Syed Stevenson MD (03/11/24 21:08:49)                   Impression:    1. Mild free fluid is seen in the pelvis.  2. Post-surgical changes relating to the gastrojejunostomy procedure are noted.  3. There is dilatation of the proximal small bowel leading from the stomach with a sharp transition at the anastomosis with the jejunum. This is worrisome for a mechanical small bowel obstruction at the jejunal-jejunal anastomotic site (image 51 series 2). Correlate with clinical and laboratory findings as regards additional evaluation and follow-up to full resolution as indicated.  4. Details and other findings as discussed above.               Narrative:    START OF REPORT:  Technique: CT of the abdomen and pelvis was performed with axial images as well as sagittal and coronal reconstruction images with intravenous contrast but without oral contrast.    Comparison: None available.    Clinical History: Abdominal pain, post-op, Nausea/vomiting, post laparoscopic gastrojejunostomy procedure Best images possible, patient unable to drink oral contrast due to nausea and vomiting, given meds as well, still could not tolerate, spoke with ordering provider and okayed changing order (OB).    Dosage Information: Automated Exposure Control was utilized.    Findings:  Lines and Tubes: None.  Thorax:  Lungs: There is minimal nonspecific dependent change at the lung bases.  Abdomen:  Abdominal Wall: There small uncomplicated umbilical hernia.  Liver: A small (1.0 cm) probable left hepatic lobe cyst is seen (series 3 image 33). Otherwise, the liver appears unremarkable.  Biliary System: No  intrahepatic or extrahepatic biliary duct dilatation is seen.  Gallbladder: Surgical clips are seen in the gallbladder fossa which may reflect prior cholecystectomy correlate with surgical history and visual inspection.  Pancreas: The pancreas appears unremarkable.  Spleen: The spleen is enlarged (16.3 cm in craniocaudal length). Prominent splenic vein is noted with no evident filling defect.  Kidneys: The left kidney appears unremarkable with no stones cysts masses or hydronephrosis. A few cystsare identified in the right kidney the largest of which measures 7 mm is on series 2 image 45 upper pole of the right kidney.  Aorta: The abdominal aorta appears unremarkable.  IVC: Unremarkable.  Bowel: Post-surgical changes relating to the gastrojejunostomy procedure are noted.  Esophagus: There is a small hiatal hernia.  Small Bowel: There is dilatation of the proximal small bowel leading from the stomach with a sharp transition at the anastomosis with the jejunum.  Colon: Nondistended. A few diverticula are seen splenic flexure and proximal descending. No associated inflammatory stranding is seen to suggest diverticulitis.  Appendix: The appendix is not identified but no inflammatory changes are seen in the right lower quadrant to suggest appendicitis.  Peritoneum: Mild free fluid is seen in the pelvis. No intraperitoneal free air is seen.    Pelvis:  Bladder: The bladder appears unremarkable.  Female:  Uterus: The uterus is not identified correlate with history of hysterectomy.  Ovaries: The ovaries appear unremarkable with probable bilateral physiologic cysts.    Bony structures:  Dorsal Spine: The visualized dorsal spine appears unremarkable. There is mild spondylosis of the visualized dorsal spine.  Bony Pelvis: The visualized bony structures of the pelvis appear unremarkable.                                         Medications                        ondansetron injection 4 mg (4 mg Intravenous Given 3/11/24 1959)    iohexoL (OMNIPAQUE 350) injection 100 mL (100 mLs Intravenous Given 3/11/24 2010)   HYDROmorphone (PF) injection 1 mg (1 mg Intravenous Given 3/11/24 2138)   ondansetron injection 8 mg (8 mg Intravenous Given 3/11/24 2138)        Medical Decision Making  Problems Addressed:  Bowel obstruction: acute illness or injury  Status post bypass gastrojejunostomy: acute illness or injury    Risk  Prescription drug management.  Decision regarding hospitalization.      ED assessment:    Ms. Celestin presented w/ abdominal pain, N/V 2 weeks post gastric sleeve revision. Afebrile but tachycardic on arrival.   Laboratory studies and imaging obtained following PA evaluation in triage notable for mild leukocytosis, CT w/ possible bowel obstruction with transition point at the jejunal-jejunal anastomotic site. She was evaluated by her surgeon who agreed w/ admission, NPO, IV fluids and will reassess in AM regarding plan going forward.     Differential diagnosis (including but not limited to):   Surgical complication, obstruction, ileus, DEEP, dehydration, electrolyte derangements      Amount and/or Complexity of Data Reviewed  Independent historian: none   Summary of history:   External data reviewed: notes from previous admissions  Summary of data reviewed: hiatal hernia repair, laparoscopic gastrojejunostomy with Jerrica-en-Y reconstruction 2/26/2024, discharged 2/28/2024  Risk and benefits of testing: discussed   Labs: ordered and reviewed  Radiology: ordered and reviewed    Discussion of management or test interpretation with external provider(s): discussed with bariatric surgery consultant   Summary of discussion: as above    Risk  Parenteral controlled substances   Decision regarding hospitalization  Shared decision making     Critical Care  none    I, Natalie Jacobson MD personally performed the history, PE, MDM, and procedures as documented above and agree with the scribe's documentation.                                      Clinical Impression:  Final diagnoses:  [K56.609] Bowel obstruction  [K56.609] Small bowel obstruction (Primary)  [Z98.0] Status post bypass gastrojejunostomy          ED Disposition Condition    Admit                 Natalie Jacobson MD  03/12/24 6682

## 2024-03-12 NOTE — PROGRESS NOTES
HD 1  AFVSS  Less complaints  No flatus, No BM, No emesis  Some cramps but not as bad as before    Labs reviewed    Abd: soft, NT ND, + BS    Add Banana Bag  SCDS, Lovenox    Expectant management    Cont NPO    Discussed plan with patient and RN

## 2024-03-12 NOTE — H&P
[facility] - Emergency Dept  General Surgery  History & Physical    Patient Name: Lisa Celestin  MRN: [id number]  Admission Date: 3/11/2024  Attending Physician: Dr. Ted Higginbotham  Primary Care Provider: Jevon Escobar MD    Patient information was obtained from patient.     Chief Complaint/Reason for Admission: NV, abdominal pain  POD 14 SP HH repair and gastrojejenostomy    History of Present Illness: POD 14 SP above procedure.  Recurrent intermittent abdominal pain over the last several days.  + emesis.  + Bms.  Pain comes in waves.  Denies fever.      Current Facility-Administered Medications on File Prior to Encounter   Medication    acetaminophen tablet 1,000 mg    cefazolin (ANCEF) 2 gram in dextrose 5% 50 mL IVPB (premix)    cloNIDine 0.1 mg/24 hr td ptwk 1 patch    dextrose 10% bolus 125 mL 125 mL    dextrose 10% bolus 250 mL 250 mL    glucagon (human recombinant) injection 1 mg    glucose chewable tablet 16 g    glucose chewable tablet 24 g    heparin (porcine) injection 5,000 Units    hydrALAZINE injection 10 mg    hyoscyamine ODT 0.125 mg    insulin aspart U-100 injection 1-10 Units    labetaloL injection 10 mg    lactated ringers infusion    midazolam (VERSED) 1 mg/mL injection 2 mg    ondansetron disintegrating tablet 4 mg    ondansetron injection 4 mg    [DISCONTINUED] pantoprazole EC tablet 40 mg    [DISCONTINUED] prochlorperazine injection Soln 5 mg    [DISCONTINUED] promethazine tablet 12.5 mg    [DISCONTINUED] traMADoL tablet 100 mg     Current Outpatient Medications on File Prior to Encounter   Medication Sig    cariprazine (VRAYLAR) 1.5 mg Cap Take 1.5 mg by mouth Daily.    chlorpheniramine (CHLOR-TRIMETON) 4 mg tablet Take 4 mg by mouth every 6 (six) hours as needed for Allergies.    cyproheptadine (PERIACTIN) 4 mg tablet Take 4 mg by mouth 2 (two) times a day.    famotidine (PEPCID) 40 MG tablet Take 40 mg by mouth every evening.    Lactobac no.41/Bifidobact no.7  (PROBIOTIC-10 ORAL) Take 1 capsule by mouth once daily.    melatonin 5 mg Cap Take by mouth.    montelukast (SINGULAIR) 10 mg tablet Take 10 mg by mouth once daily.    ondansetron (ZOFRAN-ODT) 4 MG TbDL Take 1 tablet (4 mg total) by mouth every 6 (six) hours as needed (first line med PRN NV).    pantoprazole (PROTONIX) 40 MG tablet Take 1 tablet (40 mg total) by mouth once daily. Take daily for first 30 days post op. DC Nexium while on this med    promethazine (PHENERGAN) 12.5 MG Tab Take 1 tablet (12.5 mg total) by mouth every 6 (six) hours as needed (2nd line med PRN NV).    sucralfate (CARAFATE) 1 gram tablet Take 1 tablet (1 g total) by mouth 4 (four) times daily.    traZODone (DESYREL) 100 MG tablet Take 100 mg by mouth every evening.       Review of patient's allergies indicates:  No Known Allergies    Past Medical History:   Diagnosis Date    ADHD     Allergies     Anxiety     Depression     Gastroesophageal reflux disease, unspecified whether esophagitis present     GERD (gastroesophageal reflux disease)     Headache due to anesthesia     History of stomach ulcers     IBS (irritable bowel syndrome)     Irregular heart beat     [place] grade D esophagitis     Nausea & vomiting     Obesity     PONV (postoperative nausea and vomiting)      Past Surgical History:   Procedure Laterality Date     SECTION      X2    CHOLECYSTECTOMY  10/03/2012    COLONOSCOPY      ESOPHAGOGASTRODUODENOSCOPY      a few    GASTROJEJUNOSTOMY, LAPAROSCOPIC  2024    Procedure: GASTROJEJUNOSTOMY, LAPAROSCOPIC;  Surgeon: Ted Higginbotham MD;  Location: [facility];  Service: General;;  WITH AUTIN    HERNIA REPAIR      HYSTERECTOMY      LAPAROSCOPIC SLEEVE GASTRECTOMY  2021    Dr. Higginbotham     Family History       Problem Relation (Age of Onset)    Hypertension Maternal Grandmother          Tobacco Use    Smoking status: Never    Smokeless tobacco: Never   Substance and Sexual Activity    Alcohol use: Not Currently     Drug use: Never    Sexual activity: Not on file     Review of Systems       Vital Signs (Most Recent):  Temp: 98.2 °F (36.8 °C) (03/11/24 1710)  Pulse: (!) 113 (03/11/24 1950)  Resp: (!) 22 (03/11/24 1950)  BP: (!) 133/94 (03/11/24 1950)  SpO2: 98 % (03/11/24 1950) Vital Signs (24h Range):  Temp:  [98.2 °F (36.8 °C)] 98.2 °F (36.8 °C)  Pulse:  [113-120] 113  Resp:  [22] 22  SpO2:  [98 %-99 %] 98 %  BP: (133-138)/() 133/94     Weight: 86.6 kg (191 lb)  Body mass index is 31.78 kg/m².    Physical Exam  Constitutional:       General: She is not in acute distress.     Appearance: Normal appearance. She is not ill-appearing.   HENT:      Head: Normocephalic and atraumatic.      Nose: Nose normal.      Mouth/Throat:      Mouth: Mucous membranes are moist.   Eyes:      Extraocular Movements: Extraocular movements intact.      Conjunctiva/sclera: Conjunctivae normal.   Cardiovascular:      Rate and Rhythm: Normal rate and regular rhythm.      Pulses: Normal pulses.      Heart sounds: Normal heart sounds.   Pulmonary:      Effort: Pulmonary effort is normal.      Breath sounds: Normal breath sounds.   Abdominal:      General: Abdomen is flat. Bowel sounds are normal. There is no distension.      Palpations: Abdomen is soft. There is no mass.      Tenderness: There is no abdominal tenderness. There is no guarding or rebound.      Hernia: No hernia is present.      Comments: Incisions healing well   Musculoskeletal:         General: Normal range of motion.      Cervical back: Normal range of motion and neck supple.   Skin:     General: Skin is warm and dry.      Coloration: Skin is not jaundiced.   Neurological:      General: No focal deficit present.      Mental Status: She is alert.   Psychiatric:         Mood and Affect: Mood normal.         Behavior: Behavior normal.         Significant Labs:  I have reviewed all pertinent lab results within the past 24 hours.  CBC:   Recent Labs   Lab 03/11/24 1716   WBC 12.63*    RBC 5.31   HGB 16.5*   HCT 46.6      MCV 87.8   MCH 31.1*   MCHC 35.4       Significant Diagnostics:  I have reviewed all pertinent imaging results/findings within the past 24 hours.  I have reviewed and interpreted all pertinent imaging results/findings within the past 24 hours.    Assessment/Plan:     Bowel obstruction  Admit  IVF  Monitor  If no improvement may require exploration      Ted Higginbotham MD  General Surgery  [facility] - Emergency Dept

## 2024-03-12 NOTE — NURSING
Nurses Note -- 4 Eyes      3/12/2024   5:57 AM      Skin assessed during: Admit      [x] No Altered Skin Integrity Present    []Prevention Measures Documented      [] Yes- Altered Skin Integrity Present or Discovered   [] LDA Added if Not in Epic (Describe Wound)   [] New Altered Skin Integrity was Present on Admit and Documented in LDA   [] Wound Image Taken    Wound Care Consulted? No    Attending Nurse:  Meryl Bull RN    Second RN/Staff Member:   Amanda Miller RN

## 2024-03-12 NOTE — PROGRESS NOTES
HD# 2    Surgery interim note    AFVSS    One dose of IV Dilaudid today and Zofran @ 1000. No additional pain med given today per RN report. No emesis reported today.    Pt has been resting/sleeping most of the day.     Banana bag nearly completed.    Has not gotten OOB today due to resting.     Encouraged OOB and ambulating in hallway.     Ok for few ice chips if no NV.     After Banana bag, change to LR maintenance IVF @ 100 cc/hr.     Offer another Banana bag in Am.     If abd pain or NV continue, plan for exploratory laparoscopy.     Discussed POC with RN and attending.     KIM Arshad NP/ Dr. Ted Higginbotham

## 2024-03-12 NOTE — ED NOTES
Attempted to call report, placed on hold. Staff reports receiving nurse is busy and will call back. Informed that patient is placed in transport; verbalized understanding.

## 2024-03-13 PROCEDURE — 63600175 PHARM REV CODE 636 W HCPCS: Performed by: NURSE PRACTITIONER

## 2024-03-13 PROCEDURE — 25000003 PHARM REV CODE 250: Performed by: SURGERY

## 2024-03-13 PROCEDURE — 63600175 PHARM REV CODE 636 W HCPCS: Performed by: SURGERY

## 2024-03-13 PROCEDURE — 11000001 HC ACUTE MED/SURG PRIVATE ROOM

## 2024-03-13 PROCEDURE — 25500020 PHARM REV CODE 255: Performed by: SURGERY

## 2024-03-13 PROCEDURE — 25000003 PHARM REV CODE 250: Performed by: NURSE PRACTITIONER

## 2024-03-13 RX ORDER — ACETAMINOPHEN 10 MG/ML
1000 INJECTION, SOLUTION INTRAVENOUS EVERY 8 HOURS
Status: COMPLETED | OUTPATIENT
Start: 2024-03-13 | End: 2024-03-14

## 2024-03-13 RX ORDER — KETOROLAC TROMETHAMINE 30 MG/ML
30 INJECTION, SOLUTION INTRAMUSCULAR; INTRAVENOUS EVERY 6 HOURS PRN
Status: DISCONTINUED | OUTPATIENT
Start: 2024-03-13 | End: 2024-03-14 | Stop reason: HOSPADM

## 2024-03-13 RX ADMIN — FOLIC ACID: 5 INJECTION, SOLUTION INTRAMUSCULAR; INTRAVENOUS; SUBCUTANEOUS at 09:03

## 2024-03-13 RX ADMIN — HYDROMORPHONE HYDROCHLORIDE 0.5 MG: 2 INJECTION INTRAMUSCULAR; INTRAVENOUS; SUBCUTANEOUS at 02:03

## 2024-03-13 RX ADMIN — HYDROMORPHONE HYDROCHLORIDE 0.5 MG: 2 INJECTION INTRAMUSCULAR; INTRAVENOUS; SUBCUTANEOUS at 09:03

## 2024-03-13 RX ADMIN — KETOROLAC TROMETHAMINE 30 MG: 30 INJECTION, SOLUTION INTRAMUSCULAR at 12:03

## 2024-03-13 RX ADMIN — Medication 6 MG: at 08:03

## 2024-03-13 RX ADMIN — DIATRIZOATE MEGLUMINE AND DIATRIZOATE SODIUM 120 ML: 600; 100 SOLUTION ORAL; RECTAL at 12:03

## 2024-03-13 RX ADMIN — ACETAMINOPHEN 1000 MG: 10 INJECTION, SOLUTION INTRAVENOUS at 10:03

## 2024-03-13 RX ADMIN — ACETAMINOPHEN 1000 MG: 10 INJECTION, SOLUTION INTRAVENOUS at 03:03

## 2024-03-13 RX ADMIN — FAMOTIDINE 20 MG: 10 INJECTION, SOLUTION INTRAVENOUS at 08:03

## 2024-03-13 RX ADMIN — ONDANSETRON 4 MG: 2 INJECTION INTRAMUSCULAR; INTRAVENOUS at 10:03

## 2024-03-13 RX ADMIN — SODIUM CHLORIDE, POTASSIUM CHLORIDE, SODIUM LACTATE AND CALCIUM CHLORIDE: 600; 310; 30; 20 INJECTION, SOLUTION INTRAVENOUS at 02:03

## 2024-03-13 RX ADMIN — ENOXAPARIN SODIUM 40 MG: 40 INJECTION SUBCUTANEOUS at 05:03

## 2024-03-13 RX ADMIN — FAMOTIDINE 20 MG: 10 INJECTION, SOLUTION INTRAVENOUS at 10:03

## 2024-03-13 NOTE — PLAN OF CARE
03/13/24 1554   Discharge Assessment   Assessment Type Discharge Planning Assessment   Confirmed/corrected address, phone number and insurance Yes   Confirmed Demographics Correct on Facesheet   Source of Information patient   When was your last doctors appointment?   (Pt states last visit with Dr Escobar was a couple of weeks ago)   Reason For Admission bowel obstruction   People in Home child(grady), dependent;spouse   Do you expect to return to your current living situation? Yes   Do you have help at home or someone to help you manage your care at home? Yes   Who are your caregiver(s) and their phone number(s)? Pt lives with  Lj [phone]   Current cognitive status: Alert/Oriented;No Deficits   Walking or Climbing Stairs Difficulty no   Dressing/Bathing Difficulty no   Equipment Currently Used at Home none   Do you currently have service(s) that help you manage your care at home? No   Do you take prescription medications? Yes   Do you have prescription coverage? Yes   Coverage [employer]   Do you have any problems affording any of your prescribed medications? No   Is the patient taking medications as prescribed? yes   Who is going to help you get home at discharge? Lj   How do you get to doctors appointments? car, drives self   Are you on dialysis? No   Do you take coumadin? No   Discharge Plan A Home with family   Discharge Plan B Home with family   DME Needed Upon Discharge    (TBD)   Discharge Plan discussed with: Patient   Transition of Care Barriers None     Pt states she lives with her  and 2 children ages 8 and 12. They live in a single level home with no steps to enter. She states she is independent with ADL's and is able to drive prior to admission. She is not active with a home health agency.

## 2024-03-13 NOTE — PROGRESS NOTES
Late entry  3/12/24 430 PM  Patient feeling better, tolerating some ice and sips  Abd benign  Expectant management

## 2024-03-13 NOTE — PROGRESS NOTES
Inpatient Nutrition Evaluation    Admit Date: 3/11/2024   Total duration of encounter: 2 days   Patient Age: 39 y.o.    Nutrition Recommendation/Prescription     -Advance diet as tolerated per MD. Goal Diet: per MD.   -Monitor wt, labs, and intake.     Nutrition Assessment     Chart Review    Reason Seen: continuous nutrition monitoring    Malnutrition Screening Tool Results   Have you recently lost weight without trying?: No  Have you been eating poorly because of a decreased appetite?: No   MST Score: 0   Diagnosis:  Bowel obstruction  Small bowel obstruction (Primary)  Status post bypass gastrojejunostomy    Relevant Medical History:    ADHD    Allergies    Anxiety    Depression    Gastroesophageal reflux disease, unspecified whether esophagitis present    GERD (gastroesophageal reflux disease)    Headache due to anesthesia    History of stomach ulcers    IBS (irritable bowel syndrome)    Irregular heart beat    [place] grade D esophagitis    Nausea & vomiting    Obesity    PONV (postoperative nausea and vomiting)       Scheduled Medications:  acetaminophen, 1,000 mg, Q8H  enoxaparin, 40 mg, Daily  famotidine (PF), 20 mg, Q12H    Continuous Infusions:  lactated ringers, Last Rate: 100 mL/hr at 03/13/24 0219    PRN Medications: ketorolac, melatonin, ondansetron    Recent Labs   Lab 03/11/24  1716 03/12/24  0530    140   K 4.7 3.8   CALCIUM 9.9 8.6   CHLORIDE 107 112*   CO2 22 21*   BUN 14.0 13.3   CREATININE 0.69 0.62   EGFRNORACEVR >60 >60   GLUCOSE 121* 119*   BILITOT 0.6 0.4   ALKPHOS 77 61   ALT 21 17   AST 15 13   ALBUMIN 4.8 3.7   LIPASE 42  --    WBC 12.63* 7.45   HGB 16.5* 14.1   HCT 46.6 40.5     Nutrition Orders:  Diet Clear liquid (no sugar)/Bariatric      Appetite/Oral Intake: not applicable/not applicable  Factors Affecting Nutritional Intake: clear liquid diet  Food/Temple/Cultural Preferences: unable to obtain  Food Allergies: no known food allergies  Last Bowel Movement:  "03/11/24  Wound(s):  surgical incision     Comments    3/13: Pt not in room during rounds; diet just advanced to clears- tolerance pending. Noted some weight loss per EMR weights; however, pt is ~2 weeks s/p gastric sleeve revision. Will f/u early with pt.     Anthropometrics    Height: 5' 5" (165.1 cm),    Last Weight: 86.6 kg (191 lb) (03/12/24 0700), Weight Method: Bed Scale  BMI (Calculated): 31.8  BMI Classification: obese grade I (BMI 30-34.9)     Ideal Body Weight (IBW), Female: 125 lb     % Ideal Body Weight, Female (lb): 152.8 %                             Usual Weight Provided By: EMR weight history    Wt Readings from Last 5 Encounters:   03/12/24 86.6 kg (191 lb)   02/27/24 90.7 kg (200 lb)   02/19/24 93.2 kg (205 lb 8 oz)   02/12/24 94.8 kg (209 lb)   02/08/24 93 kg (205 lb)     Weight Change(s) Since Admission:   Wt Readings from Last 1 Encounters:   03/12/24 0700 86.6 kg (191 lb)   03/11/24 1709 86.6 kg (191 lb)   Admit Weight: 86.6 kg (191 lb) (03/11/24 1709), Weight Method: Bed Scale    Patient Education     Not applicable.    Nutrition Goals & Monitoring     Dietitian will monitor: energy intake and weight    Nutrition Risk/Follow-Up: low (follow-up in 5-7 days)  Patients assigned 'low nutrition risk' status do not qualify for a full nutritional assessment but will be monitored and re-evaluated in a 5-7 day time period. Please consult if re-evaluation needed sooner.   "

## 2024-03-13 NOTE — PROGRESS NOTES
HD # 3     AFVSS    UGI revealed contrast able to pass through JJ anastomosis. No obstruction.     Pt reports mild occasional abdominal crampy pain overnight but no longer having NV.     No BM yet.     Voiding well.     OOB in chair.     Tolerated bariatric clears this Am    Resume bariatric clears today slowly.     Npo after MN in case needs dx laparoscopy tomorrow. Hope to avoid surgery at this time.     Will continue to monitor.     Offer additional Banana bag tomorrow.

## 2024-03-13 NOTE — PROGRESS NOTES
Visited with pt during bariatric surgery rounding. Pt has gastrojejunostomy 16 days ago. Pt is currently NPO with further surgical assessment planned for today. Bariatric RD will revisit pt once a plan of care is made and diet is advanced under surgeon's direction. It was reiterated that pt must follow dietary guidelines as they are written to prevent surgical complications. Pt was reminded of information reviewed in one on one education and that can be found in the bariatric manual provided to her prior to surgery. Will follow up prn.

## 2024-03-13 NOTE — PROGRESS NOTES
Spoke with patient upon rounding today. She stated she felt a little better but still had some nausea and discomfort.  Patient stated she didn't intentionally eat off of the diet plan but she didn't know what to eat.  Reminded patient about the education that she received pre-operatively about her post-op dietary and behavioral protocols. Also reiterated that the  diet progression post-op can be found in the Bariatric booklet that she was given during her education appointment and the importance of following the dietary and behavioral guidelines to prevent complications post-operatively.  Encouraged patient to ambulate in the hallway every 2 hours today.  Awaiting plan of care from Bariatric Surgeon to determine if additional education is needed.

## 2024-03-14 VITALS
TEMPERATURE: 99 F | RESPIRATION RATE: 18 BRPM | DIASTOLIC BLOOD PRESSURE: 84 MMHG | SYSTOLIC BLOOD PRESSURE: 124 MMHG | OXYGEN SATURATION: 97 % | HEIGHT: 65 IN | HEART RATE: 67 BPM | BODY MASS INDEX: 31.82 KG/M2 | WEIGHT: 191 LBS

## 2024-03-14 PROBLEM — R10.9 ABDOMINAL PAIN: Status: ACTIVE | Noted: 2024-03-14

## 2024-03-14 PROCEDURE — 25000003 PHARM REV CODE 250: Performed by: NURSE PRACTITIONER

## 2024-03-14 PROCEDURE — 25000003 PHARM REV CODE 250: Performed by: SURGERY

## 2024-03-14 PROCEDURE — 63600175 PHARM REV CODE 636 W HCPCS: Performed by: NURSE PRACTITIONER

## 2024-03-14 RX ADMIN — SODIUM CHLORIDE, POTASSIUM CHLORIDE, SODIUM LACTATE AND CALCIUM CHLORIDE: 600; 310; 30; 20 INJECTION, SOLUTION INTRAVENOUS at 06:03

## 2024-03-14 RX ADMIN — ACETAMINOPHEN 1000 MG: 10 INJECTION, SOLUTION INTRAVENOUS at 05:03

## 2024-03-14 RX ADMIN — FAMOTIDINE 20 MG: 10 INJECTION, SOLUTION INTRAVENOUS at 08:03

## 2024-03-14 RX ADMIN — FOLIC ACID: 5 INJECTION, SOLUTION INTRAMUSCULAR; INTRAVENOUS; SUBCUTANEOUS at 09:03

## 2024-03-14 NOTE — DISCHARGE INSTRUCTIONS
HOSPITAL DISCHARGE INSTRUCTIONS    Clinic Phone Numbers       Surgeons office number and after hours on call surgeon: [phone].    ALWAYS call the surgeons office PRIOR to going to an Urgent Care or the emergency room. If medical emergency, call 911.     Signs and Symptoms that would warrant a phone call of the office (regardless of the time of day):     Fever greater than 101 F     Uncontrolled pain that does not improve with pain medication     Uncontrolled nausea that does not improve with nausea medication      Vomiting     Shortness of breath     Chest Pain     Foul smelling drainage from incision and/or yellow or green drainage from incision     Red, hot painful incisions     Bloody bowel movements     ** If you feel as though it is a life-threatening emergency, call 911 and go to the emergency room**          Prescriptions     Medications     Pain medication (if needed) Tylenol over the counter is safe to take for discomfort     Anti-nausea medication (if needed)     Proton Pump Inhibitor (finish all 30 days), call [phone] if you did not receive 30 days of medication       Supplements     Chewable multivitamin- take 2 times a day (unless otherwise directed)     Chewable Calcium Citrate with D3- take 3 times a day (unless otherwise directed)     Iron tablet- take once daily      MiraLAX- take once daily for 2 weeks       Home Medications     Please review your medication list that you received at pre-op class as well as at the hospital instructions upon discharge to assure you are resuming all medications that are deemed  safe after surgery.      ** REMINDER- You should have scheduled your follow-up with your prescribing doctor for 1-week post-op**          Appointments     Surgeons Post-op Visits- please review orange sheet you received in the mail after surgery. Call [phone] if you need to reschedule your surgeons post-op visit.      Bariatric Team Post-op Visits- please review blue sheet  you received in your e-mail or please reference [facility] for your post-op appointments. . Call [phone] option 1 if you need to reschedule your bariatric team post-op visit.          Nutritional Considerations     Hydration is CRITICAL!     Daily fluid intake of  oz water     Water is more important than protein      Review list of allowable and non-allowable liquids in your Bariatric Booklet    The only fluids that count towards your water goal is water, sugar free, caffeine free flavored water        Diet progression          Continue the dietary protocol until you meet with the dietitian at your 2-week visit     Strive to reach protein goal. Only liquids counted toward your protein goal are protein shake, milk and approved yogurt     It is MANDATORY that you do not progress your diet without speaking to the dietitian to prevent potential complications          Incisional Care     Wash your hands before you touch your incisions or dressings     Remove any gauze or dressing over your incision. ONLY steri-strips (butter-fly band aids) should remain over your incision     Shower daily with an anti-bacterial soap (Hibiclens or Dial, orange bar).      Allow water to hit your back in the shower     Wet the incisions with water     Apply soap to a clean washcloth and wash over your incisions (do not scrub)     Rinse your incision with water and pat dry with a clean towel      Check your incisions daily for any redness, swelling, hot to touch, or bright red, green or yellow drainage.             Dark red, dried blood, indention of an incision, bruising may appear under the steri-strips. This is normal.     Do not apply any creams, ointments, etc. on the incisions.      Leave incision open to air (unless instructed otherwise)          Activity     Walk and/or ride a stationary bike 20 minutes a day     Do not lift, pull or push anything greater than 10 pounds for 4-6 weeks     Do not go the gym until you are  4-6 weeks post-op     Use your incentive spirometer (breathing machine) 10 x an hour for 1-2 weeks     Shower daily, DO NOT submerge yourself in water until 2 weeks post-op and cleared by surgeon          Post-Operative Expectations     A soreness is to be expected. Pain differs for everyone. Please refer to the pain scale and list of when to call the doctor regarding pain.     Nausea can last a few weeks after surgery due to the body getting adjusted to the new small stomach. Take anti-nausea as needed and stay HYDRATED. Slight dehydration will cause nausea.     Constipation is common after surgery. Take MiraLAX daily even if your bowel movements are regular. Please refer to the FAQs regarding constipation. Water is essential in preventing constipation.

## 2024-03-14 NOTE — PROGRESS NOTES
HD 3  No complaints  No pain, nausea, vomiting, cramps  Abd: benign    + BM, polo liquids    Cont zachery clears    Likely home today    FU Monday

## 2024-03-14 NOTE — PROGRESS NOTES
Visited with patient today during rounding.  Reviewed Bariatric clear liquid diet with the patient.  Expressed the importance of following the Bariatric clear liquid to prevent complications.  Reminded patient to reference her patient education book for a list of allowable liquids and instructed her not to consume anything that was not listed in the booklet.  Patient verbalized understanding and stated she would not eat off of the dietary plan again.

## 2024-03-14 NOTE — PROGRESS NOTES
Late entry  6 PM  3/13  Feel great  No pain after gastrgraffin passed  + BM  Abd benign  Adv to Oliver clears

## 2024-03-15 ENCOUNTER — PATIENT OUTREACH (OUTPATIENT)
Dept: ADMINISTRATIVE | Facility: CLINIC | Age: 39
End: 2024-03-15
Payer: COMMERCIAL

## 2024-03-15 NOTE — DISCHARGE SUMMARY
[facility] - 8th Floor Med Surg  General Surgery  Discharge Summary      Patient Name: Lisa Celestin  MRN: [id number]  Admission Date: 3/11/2024  Hospital Length of Stay: 3 days  Discharge Date and Time:  03/15/2024 9:11 AM  Attending Physician: Dr. Ted Higginbotham   Discharging Provider: Dr. Ted Higginbotham   Primary Care Provider: Jevon Escobar MD       Hospital Course:  Ms. Lisa Celestin is a 39 y.o. female that presented to hospital with c/o crampy abdominal pain and NV on 3/11/24. Patient admitted to post op dietary non-compliance in POD 11 and POD 13. She developed abdominal pain, NV after this.  Symptoms persisted and patient was advised to present to ED for further evaluation and treatment.  POD# 14 S/P Lap GJ bypass and repair of hiatal hernia. CT upon admit suspicious for possible small bowel obstruction at JJ anastomosis. There are postsurgical changes of gastric bypass. The Jerrica limb is distended measuring up to 3.9 cm in diameter with mild associated edema. There is caliber change at the jejunojejunostomy. No evidence of bowel obstruction or inflammation.   Patient was admitted for IVF and bowel rest. Patient offered pain medication and anti-emetics as needed. HD# 3 UGI with SBFT completed which revealed contrast passed through JJ anastomosis. Patient was able to advance diet after that to bariatric clears. SBO resolved without surgery. She was advised to remain on bariatric clears and discharged to home on 3/14/24.        Procedure: none  Pending Diagnostic Studies:       None          Final Active Diagnoses:    Diagnosis Date Noted POA    PRINCIPAL PROBLEM:  Abdominal pain [R10.9] 03/14/2024 Yes      Problems Resolved During this Admission:      Discharged Condition: good    Disposition: Home or Self Care    Follow Up:   Follow-up Information       Ted Higginbotham MD Follow up on 3/18/2024.    Specialty: General Surgery  Why: Follow up in office with Dr. Higginbotham on  3/18/24. Remain on bariatric clears until follow up appt.  Contact information:  [address]  [address]  [facility] [address]  [phone]                           Patient Instructions:   No discharge procedures on file.  Medications:  Reconciled Home Medications:      Medication List        ASK your doctor about these medications      chlorpheniramine 4 mg tablet  Commonly known as: CHLOR-TRIMETON  Take 4 mg by mouth every 6 (six) hours as needed for Allergies.     cyproheptadine 4 mg tablet  Commonly known as: PERIACTIN  Take 4 mg by mouth 2 (two) times a day.     famotidine 40 MG tablet  Commonly known as: PEPCID  Take 40 mg by mouth every evening.     melatonin 5 mg Cap  Take by mouth.     montelukast 10 mg tablet  Commonly known as: SINGULAIR  Take 10 mg by mouth once daily.     ondansetron 4 MG Tbdl  Commonly known as: ZOFRAN-ODT  Take 1 tablet (4 mg total) by mouth every 6 (six) hours as needed (first line med PRN NV).     pantoprazole 40 MG tablet  Commonly known as: PROTONIX  Take 1 tablet (40 mg total) by mouth once daily. Take daily for first 30 days post op. DC Nexium while on this med     PROBIOTIC-10 ORAL  Take 1 capsule by mouth once daily.     promethazine 12.5 MG Tab  Commonly known as: PHENERGAN  Take 1 tablet (12.5 mg total) by mouth every 6 (six) hours as needed (2nd line med PRN NV).     sucralfate 1 gram tablet  Commonly known as: CARAFATE  Take 1 tablet (1 g total) by mouth 4 (four) times daily.     traZODone 100 MG tablet  Commonly known as: DESYREL  Take 100 mg by mouth every evening.     VRAYLAR 1.5 mg Cap  Generic drug: cariprazine  Take 1.5 mg by mouth Daily.            FU 3/18/24 in office with Dr. Higginbotham.       Hailey Arshad, [facility]  General Surgery  [facility] - 8th Floor Med Surg

## 2024-03-18 ENCOUNTER — TELEPHONE (OUTPATIENT)
Dept: SURGERY | Facility: CLINIC | Age: 39
End: 2024-03-18
Payer: COMMERCIAL

## 2024-03-18 NOTE — TELEPHONE ENCOUNTER
Pt called to give update of her weekend since being discharged  She was on clear liquid diet and Dr Anahy soto'd her having protein shake as well- states her weekend went great- she tolerated liquids great  Per Hailey- brittany to advance to full liquid diet x3 days and call Thursday with update and may advance her to pure then  Pt voiced understanding

## 2024-03-19 ENCOUNTER — OFFICE VISIT (OUTPATIENT)
Dept: SURGERY | Facility: CLINIC | Age: 39
End: 2024-03-19
Payer: COMMERCIAL

## 2024-03-19 ENCOUNTER — CLINICAL SUPPORT (OUTPATIENT)
Dept: BARIATRICS | Facility: HOSPITAL | Age: 39
End: 2024-03-19

## 2024-03-19 VITALS
WEIGHT: 188 LBS | BODY MASS INDEX: 31.32 KG/M2 | DIASTOLIC BLOOD PRESSURE: 81 MMHG | HEIGHT: 65 IN | TEMPERATURE: 99 F | SYSTOLIC BLOOD PRESSURE: 114 MMHG | HEART RATE: 92 BPM

## 2024-03-19 VITALS — BODY MASS INDEX: 31.45 KG/M2 | WEIGHT: 189 LBS

## 2024-03-19 DIAGNOSIS — Z98.84 HISTORY OF BARIATRIC SURGERY: Primary | ICD-10-CM

## 2024-03-19 DIAGNOSIS — Z98.890 POST-OPERATIVE STATE: Primary | ICD-10-CM

## 2024-03-19 PROCEDURE — 3079F DIAST BP 80-89 MM HG: CPT | Mod: CPTII,,, | Performed by: SURGERY

## 2024-03-19 PROCEDURE — 99024 POSTOP FOLLOW-UP VISIT: CPT | Mod: ,,, | Performed by: SURGERY

## 2024-03-19 PROCEDURE — 3074F SYST BP LT 130 MM HG: CPT | Mod: CPTII,,, | Performed by: SURGERY

## 2024-03-19 NOTE — PROGRESS NOTES
"POST OP BARIATRIC NUTRITION FOLLOW UP    Type of surgery: revisional procedure  Post-op visit:   [] 2 weeks  [] 4 weeks:  [] 2 months:  [] 4 months:  [] 6 months:  [] 9 months:  [] 1 year:   [x] Other: 3 weeks    Height:   Ht Readings from Last 1 Encounters:   03/12/24 5' 5" (1.651 m)      Weight:   Wt Readings from Last 3 Encounters:   03/19/24 1445 85.7 kg (189 lb)   03/12/24 0700 86.6 kg (191 lb)   03/11/24 1709 86.6 kg (191 lb)   02/27/24 0923 90.7 kg (200 lb)   02/26/24 0610 90.9 kg (200 lb 6.4 oz)   02/19/24 1042 93 kg (205 lb)      BMI:   BMI Readings from Last 1 Encounters:   03/19/24 31.45 kg/m²            Post op complications:   [] Yes [x] No  If yes: [] Nausea   [] Vomiting   [] Constipation    [] Diarrhea    [] Other:     Dietary tolerance:  [x] Yes [] No [] Comment:     Adequate fluid intake:  [x] Yes [] No Approx. daily fluid intake:   Adequate protein intake:  [x] Yes [] No  Approx. daily protein intake:    Vitamins/Minerals:   [] MVI:    [] Biotin:  [] Calcium:    [] Hair/Nails:  [] B 50 complex:   [] Bariatric Specific MVI:  [] B12:    [] Bariatric Specific Calcium:  [] Iron:    [x] Other: Due to SBO, pt was advised to hold vitamins until cleared by surgeon who she is seeing today.  [] Non-compliance:        Labs:   not ordered for this visit.  Comment:    Expected compliance:  [x]Good  []Fair  []Poor      Progress:   [x]Patient progressing well at this time with no complaints   [] Patient expressed complaint at this time: See additional notes       Bariatric Diet Education:   Verbalizes understanding Demonstrates  Needs further teaching Needs practice/ supervision Comments    Bariatric Clear [] [] [] []    Bariatric Full [] [] [] []    Bariatric Puree [x] [] [] []    Bariatric Soft [x] [] [] []    Bariatric Regular [] [] [] []    Bariatric Reintroduction of Starchy CHO [] [] [] []    Bariatric: Mindful Eating [] [] [] []    Importance of Protein and Vitamin Protocol [] [] [] []    Other:      "       Additional Notes:

## 2024-04-01 ENCOUNTER — OFFICE VISIT (OUTPATIENT)
Dept: SURGERY | Facility: CLINIC | Age: 39
End: 2024-04-01
Payer: COMMERCIAL

## 2024-04-01 DIAGNOSIS — Z13.21 ENCOUNTER FOR VITAMIN DEFICIENCY SCREENING: ICD-10-CM

## 2024-04-01 DIAGNOSIS — Z91.89 ELECTROLYTE IMBALANCE RISK: ICD-10-CM

## 2024-04-01 DIAGNOSIS — Z13.0 SCREENING FOR IRON DEFICIENCY ANEMIA: Primary | ICD-10-CM

## 2024-04-01 PROCEDURE — 99024 POSTOP FOLLOW-UP VISIT: CPT | Mod: 95,,, | Performed by: SURGERY

## 2024-04-01 NOTE — PROGRESS NOTES
Post op phone call from recent hospital admit:    - feeling much better. Tolerating solids. Occasional feeling of globus but this is improving. Able to tolerate chicken. No heartburn or reflux.    Dr. Higginbotham called pt and discussed plan of care with pt    Keep routine FU appt for routine 2 month appt.     Johnny KEBEDE/Dr. Higginbotham

## 2024-04-19 ENCOUNTER — TELEPHONE (OUTPATIENT)
Dept: SURGERY | Facility: CLINIC | Age: 39
End: 2024-04-19
Payer: COMMERCIAL

## 2024-04-29 ENCOUNTER — PATIENT MESSAGE (OUTPATIENT)
Dept: SURGERY | Facility: CLINIC | Age: 39
End: 2024-04-29

## 2024-04-29 ENCOUNTER — CLINICAL SUPPORT (OUTPATIENT)
Dept: BARIATRICS | Facility: HOSPITAL | Age: 39
End: 2024-04-29

## 2024-04-29 ENCOUNTER — OFFICE VISIT (OUTPATIENT)
Dept: SURGERY | Facility: CLINIC | Age: 39
End: 2024-04-29
Payer: COMMERCIAL

## 2024-04-29 VITALS
HEART RATE: 71 BPM | BODY MASS INDEX: 29.93 KG/M2 | SYSTOLIC BLOOD PRESSURE: 120 MMHG | HEIGHT: 65 IN | DIASTOLIC BLOOD PRESSURE: 82 MMHG | WEIGHT: 179.63 LBS

## 2024-04-29 DIAGNOSIS — Z71.3 ENCOUNTER FOR WEIGHT LOSS COUNSELING: ICD-10-CM

## 2024-04-29 DIAGNOSIS — Z13.21 ENCOUNTER FOR VITAMIN DEFICIENCY SCREENING: Primary | ICD-10-CM

## 2024-04-29 DIAGNOSIS — Z71.3 DIETARY COUNSELING: ICD-10-CM

## 2024-04-29 DIAGNOSIS — Z13.0 SCREENING, ANEMIA, DEFICIENCY, IRON: ICD-10-CM

## 2024-04-29 DIAGNOSIS — R09.A2 GLOBUS SENSATION: ICD-10-CM

## 2024-04-29 DIAGNOSIS — Z98.84 S/P GASTRIC BYPASS: ICD-10-CM

## 2024-04-29 DIAGNOSIS — Z71.82 EXERCISE COUNSELING: ICD-10-CM

## 2024-04-29 DIAGNOSIS — Z98.84 HISTORY OF BARIATRIC SURGERY: Primary | ICD-10-CM

## 2024-04-29 DIAGNOSIS — R13.10 DYSPHAGIA, UNSPECIFIED TYPE: ICD-10-CM

## 2024-04-29 DIAGNOSIS — Z91.89 ELECTROLYTE IMBALANCE RISK: ICD-10-CM

## 2024-04-29 DIAGNOSIS — E66.9 OBESITY: Primary | ICD-10-CM

## 2024-04-29 LAB
ALBUMIN SERPL-MCNC: 4.9 G/DL (ref 3.9–4.9)
ALBUMIN/GLOB SERPL: 2.1 {RATIO} (ref 1.2–2.2)
ALP SERPL-CCNC: 75 IU/L (ref 44–121)
ALT SERPL-CCNC: 24 IU/L (ref 0–32)
AST SERPL-CCNC: 17 IU/L (ref 0–40)
BASOPHILS # BLD AUTO: 0.1 X10E3/UL (ref 0–0.2)
BASOPHILS NFR BLD AUTO: 2 %
BILIRUB SERPL-MCNC: 0.4 MG/DL (ref 0–1.2)
BUN SERPL-MCNC: 15 MG/DL (ref 6–20)
BUN/CREAT SERPL: 25 (ref 9–23)
CALCIUM SERPL-MCNC: 10.2 MG/DL (ref 8.7–10.2)
CHLORIDE SERPL-SCNC: 103 MMOL/L (ref 96–106)
CO2 SERPL-SCNC: 21 MMOL/L (ref 20–29)
CREAT SERPL-MCNC: 0.59 MG/DL (ref 0.57–1)
EOSINOPHIL # BLD AUTO: 0.1 X10E3/UL (ref 0–0.4)
EOSINOPHIL NFR BLD AUTO: 2 %
ERYTHROCYTE [DISTWIDTH] IN BLOOD BY AUTOMATED COUNT: 12.4 % (ref 11.7–15.4)
EST. GFR  (NO RACE VARIABLE): 119 ML/MIN/1.73
GLOBULIN SER CALC-MCNC: 2.3 G/DL (ref 1.5–4.5)
GLUCOSE SERPL-MCNC: 89 MG/DL (ref 70–99)
HCT VFR BLD AUTO: 44.6 % (ref 34–46.6)
HGB BLD-MCNC: 15.1 G/DL (ref 11.1–15.9)
IMM GRANULOCYTES NFR BLD AUTO: 1 %
IRON SATN MFR SERPL: 37 % (ref 15–55)
IRON SERPL-MCNC: 116 UG/DL (ref 27–159)
LYMPHOCYTES # BLD AUTO: 1.2 X10E3/UL (ref 0.7–3.1)
LYMPHOCYTES NFR BLD AUTO: 21 %
MCH RBC QN AUTO: 31.3 PG (ref 26.6–33)
MCHC RBC AUTO-ENTMCNC: 33.9 G/DL (ref 31.5–35.7)
MCV RBC AUTO: 92 FL (ref 79–97)
MONOCYTES # BLD AUTO: 0.4 X10E3/UL (ref 0.1–0.9)
MONOCYTES NFR BLD AUTO: 6 %
NEUTROPHILS # BLD AUTO: 3.9 X10E3/UL (ref 1.4–7)
NEUTROPHILS NFR BLD AUTO: 68 %
PLATELET # BLD AUTO: 291 X10E3/UL (ref 150–450)
POTASSIUM SERPL-SCNC: 4.6 MMOL/L (ref 3.5–5.2)
PROT SERPL-MCNC: 7.2 G/DL (ref 6–8.5)
RBC # BLD AUTO: 4.83 X10E6/UL (ref 3.77–5.28)
SODIUM SERPL-SCNC: 139 MMOL/L (ref 134–144)
SPECIMEN STATUS REPORT: NORMAL
TIBC SERPL-MCNC: 314 UG/DL (ref 250–450)
UIBC SERPL-MCNC: 198 UG/DL (ref 131–425)
VIT B1 BLD-SCNC: 135.6 NMOL/L (ref 66.5–200)
VIT B12 SERPL-MCNC: 839 PG/ML (ref 232–1245)
WBC # BLD AUTO: 5.7 X10E3/UL (ref 3.4–10.8)

## 2024-04-29 PROCEDURE — 3008F BODY MASS INDEX DOCD: CPT | Mod: CPTII,,, | Performed by: NURSE PRACTITIONER

## 2024-04-29 PROCEDURE — 3079F DIAST BP 80-89 MM HG: CPT | Mod: CPTII,,, | Performed by: NURSE PRACTITIONER

## 2024-04-29 PROCEDURE — 99024 POSTOP FOLLOW-UP VISIT: CPT | Mod: ,,, | Performed by: NURSE PRACTITIONER

## 2024-04-29 PROCEDURE — 1159F MED LIST DOCD IN RCRD: CPT | Mod: CPTII,,, | Performed by: NURSE PRACTITIONER

## 2024-04-29 PROCEDURE — 3074F SYST BP LT 130 MM HG: CPT | Mod: CPTII,,, | Performed by: NURSE PRACTITIONER

## 2024-04-29 NOTE — PROGRESS NOTES
"Initial Consult  Lisa Celestin  Chief Complaint   Patient presents with    Post-op Evaluation     S/p gastro J 2/26/2024       History of Present Illness:  Ms. Lisa Celestin is a 39 y.o. female who is 2 mth s/p lap revisional gastric bypass surgery with hiatal hernia repair for intractable reflux on 2/26/24 by Ted Higginbotham MD. Presents today for 2 mth follow up appt. No complaints with tolerating PO. No  GERD, NV, or abd pain. Regular BMs.     Admission and DC from hospital from 3/11-3/14/24 for small bowel obstruction at JJ anastomosis. Bowel rest and advanced diet without any issues or surgical intervention. Since that time her only complaint has been mild dysphasia.     Pt reports dysphagia and globus sensation with some proteins such as chicken, steak, some fish. Only occurring with proteins. Still able to get in water, shakes, fruits, veggies, etc. Still has a variety of other proteins she is able to eat.     Diet: compliant with bariatric meal plan  Exercise: no regular exercise  Vitamins: not compliant with bariatric supplementation         Labs (2/24/24): WNLs    Previous history of Lap sleeve gastrectomy 2/1/2021 with Dr. Higginbotham.  She states she had reflux and heartburn symptoms prior to sleeve gastrectomy.      Pre op VSG weight 248 #  2 weeks post op 231 #  2 months post op 211 #  6 months post op 186 #          BMI 30.95 (Eugenio weight)  2.5 years post op 200 #          BMI 33.28        HT: 65in  Weights:   Trend Weights BMI   Starting 205# 33   2 Week 189#    2 Month 179# 29   6 Month      1 Year     2 Year               For Adults 20 Years and Older:    BMI Weight Status   Below 18.5 Underweight   18.6-24.9 Normal/Healthy   25.0-29.9 Overweight   30.0 & Above Obese     Ideal Weight Range for Your Height: 5'5" = 114 - 149 lbs      Pertinent History:  HTN - [] Yes   [x] No  HLD - [] Yes   [x] No  DM  -  [] Yes   [x] No  DEEPAK - [] Yes   [x] No  GERD - [] Yes   [x] No, resolved "   Anticoagulation- [] Yes   [x] No    Patient Care Team:  Jevon Escobar MD as PCP - General (Family Medicine)  Ted Higginbotham MD as Surgeon (Bariatrics)  Deborah Bowman LPN as Licensed Practical Nurse (Bariatrics)     Subjective:     12 point review of systems conducted, negative except as stated in the history of present illness. See HPI for details.    Review of patient's allergies indicates:  No Known Allergies  Past Medical History:   Diagnosis Date    ADHD     Allergies     Anxiety     Depression     Gastroesophageal reflux disease, unspecified whether esophagitis present     GERD (gastroesophageal reflux disease)     Headache due to anesthesia     History of stomach ulcers     IBS (irritable bowel syndrome)     Irregular heart beat     [place] grade D esophagitis     Nausea & vomiting     Obesity     PONV (postoperative nausea and vomiting)      Past Surgical History:   Procedure Laterality Date     SECTION      X2    CHOLECYSTECTOMY  10/03/2012    COLONOSCOPY      ESOPHAGOGASTRODUODENOSCOPY      a few    GASTROJEJUNOSTOMY, LAPAROSCOPIC  2024    Procedure: GASTROJEJUNOSTOMY, LAPAROSCOPIC;  Surgeon: Ted Higginbotham MD;  Location: [facility] OR;  Service: General;;  WITH AUTIN    HERNIA REPAIR      HYSTERECTOMY      LAPAROSCOPIC SLEEVE GASTRECTOMY  2021    Dr. Higginbotham     Family History   Problem Relation Name Age of Onset    Hypertension Maternal Grandmother       Social History     Tobacco Use    Smoking status: Never    Smokeless tobacco: Never   Substance Use Topics    Alcohol use: Not Currently    Drug use: Never      Current Outpatient Medications   Medication Instructions    Lactobac no.41/Bifidobact no.7 (PROBIOTIC-10 ORAL) 1 capsule, Oral, Daily    melatonin 5 mg Cap Oral    montelukast (SINGULAIR) 10 mg, Oral, Daily    traZODone (DESYREL) 100 mg, Oral, Nightly    VRAYLAR 3 mg, Oral, Daily       Objective:     Vital Signs (Most Recent):  Visit Vitals  /82   Pulse 71  "  Ht 5' 5" (1.651 m)   Wt 81.5 kg (179 lb 9.6 oz)   BMI 29.89 kg/m²       Physical Exam:  General:  Alert and oriented.    Respiratory:  Lungs are clear to auscultation, Respirations are non-labored, Breath sounds are equal.    Cardiovascular:  Normal rate, Regular rhythm, No murmur.    Gastrointestinal:  Soft, Non-tender, Non-distended, Normal bowel sounds    Musculoskeletal:  Normal range of motion, Normal strength.    Integumentary:  Warm, Dry, Pink.    Neurologic:  Alert, Oriented.    Psychiatric:  Cooperative.      Assessment:       ICD-10-CM ICD-9-CM   1. Encounter for vitamin deficiency screening  Z13.21 V77.99   2. Screening, anemia, deficiency, iron  Z13.0 V78.0   3. Electrolyte imbalance risk  Z91.89 V49.89   4. S/P gastric bypass  Z98.84 V45.86   5. Encounter for weight loss counseling  Z71.3 V65.3   6. Dietary counseling  Z71.3 V65.3   7. Exercise counseling  Z71.82 V65.41   8. Dysphagia, unspecified type  R13.10 787.20   9. Globus sensation  R09.A2 784.99       Plan:     1. Encounter for vitamin deficiency screening  Vitamin B1, WB    Vitamin B12      2. Screening, anemia, deficiency, iron  Iron and TIBC    CBC Auto Differential      3. Electrolyte imbalance risk  Comprehensive Metabolic Panel      4. S/P gastric bypass        5. Encounter for weight loss counseling        6. Dietary counseling        7. Exercise counseling        8. Dysphagia, unspecified type        9. Globus sensation          Plan:  - gave pt information about the bariatric one a day multivitamin. Need to continue calcium supplements while on them. continue  vitamin since it contains iron. please eat before taking, it can make pt's very nauseated on empty stomach.   - continue to eat the foods that work well and go down easily. If you begin to have issues with the foods that are now easy to eat please reach out to the office. Might need and EGD with dilation. Pt verbalized understanding  - pt understands that she needs to " increase intensity of exercise. Discuss with exercise physiologist what is appropriate at this time.   - F/U 4 months with bariatric labs  - Continue diet  - Continue vitamin supplementation  - Support group attendance encouraged  - Keep routine appts with PCP/specialists as scheduled

## 2024-04-29 NOTE — PROGRESS NOTES
"POST OP BARIATRIC NUTRITION FOLLOW UP    Type of surgery:  GJ   Post-op visit:   [] 2 weeks  [] 4 weeks:  [x] 2 months:  [] 4 months:  [] 6 months:  [] 9 months:  [] 1 year:   [] Other:     Height:   Ht Readings from Last 1 Encounters:   04/29/24 5' 5" (1.651 m)      Weight:   Wt Readings from Last 3 Encounters:   04/29/24 0947 81.5 kg (179 lb 9.6 oz)   03/19/24 1519 85.3 kg (188 lb)   03/19/24 1445 85.7 kg (189 lb)      BMI:   BMI Readings from Last 1 Encounters:   04/29/24 29.89 kg/m²            Post op complications:   [] Yes [x] No  If yes: [] Nausea   [] Vomiting   [] Constipation    [] Diarrhea    [x] Other: had vomited at 4 weeks some but it's been fixed since then    Dietary tolerance:  [x] Yes [] No [] Comment:     Adequate fluid intake:  [] Yes [x] No Approx. daily fluid intake: 50 oz, encourage pt to attempt to drink 1 more bottle daily  Adequate protein intake:  [x] Yes [] No  Approx. daily protein intake: 80 g    Vitamins/Minerals:   [x] MVI:    [] Biotin:  [x] Calcium:    [] Hair/Nails:  [x] B 50 complex:   [] Bariatric Specific MVI:  [x] B12:    [] Bariatric Specific Calcium:  [x] Iron:    [] Other:   [] Non-compliance:        Labs:   reviewed  Comment: can stop iron bc high labs    Expected compliance:  [x]Good  []Fair  []Poor      Progress:   [x]Patient progressing well at this time with no complaints   [] Patient expressed complaint at this time: See additional notes       Bariatric Diet Education:   Verbalizes understanding Demonstrates  Needs further teaching Needs practice/ supervision Comments    Bariatric Clear [] [] [] []    Bariatric Full [] [] [] []    Bariatric Puree [] [] [] []    Bariatric Soft [] [] [] []    Bariatric Regular [x] [] [] []    Bariatric Reintroduction of Starchy CHO [] [] [] []    Bariatric: Mindful Eating [] [] [] []    Importance of Protein and Vitamin Protocol [] [] [] []    Other:            Additional Notes:   Pt has lost 9# since last visit. She reports no issues of " n/v/d. Pt said she has not been compliant with exact vitamin regimen but has at least taken all required vitamins once daily. Discussed Bariatric specific vitamin and pt said she would start taking this instead to improve compliance. She stated she started taking her ADD medication again so her appetite has lowered significantly. She drinks a protein shake in the morning, and then has two meat options for lunch and dinner with veggies. She does not snack but said she feels like she has low energy. Rec'd adding in a snack to help increase energy levels or add more protein/veggies to lunch and dinner. Also discussed changing up SF flavors to help increase water intake. Claimed chicken has still been tough to swallow but we went through some ways to help soften it to attempt smoother digestion. Pt understood and has no complaints or concerns at this time.

## 2024-04-29 NOTE — PROGRESS NOTES
A 2 month F/U post op visit was conducted in the office. Patient's current weight is 179 LBS. Lisa lost 27 lbs. And 11 inches. A body composition and measurements were conducted.  Patient was educated on results. Her goal is 150-160 lbs. She is not exercising due to time. 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 She is craving diet coke, but admits that she has not drank one.      PLAN:  - Patient will do 20 min. Fiton jose videos from home.  - Patient will follow dietary advice from Hailey King RD.      It is my professional opinion that patient is in the preparation stage of behavior change.    MEDHAT Meeks, CPT, CHC

## 2024-06-26 NOTE — PROGRESS NOTES
[facility]  [address] [place] [id number]  Phone: [phone]  Fax: [phone]    [facility]  Post op Bariatric Note  Dr. Ted Higginbotham     Patient: Lisa Celestin  Date: 06/26/2024   Author: Ted Higginbotham MD    Chief Complaint:   Chief Complaint   Patient presents with    Post-op Evaluation     Lap gastroj 2/26/24       History of Present Illness:  Ms. Lisa Celestin is a 39 y.o. female who is 4 weeks s/p Lap Jerrica-en-Y Gastric Bypass on 2/26/24 by Dr. Ted Higginbotham.   Pathology benign.   Presents today for routine 3-4 week follow up appt. Was recently admitted and discharged for partial SBO which resolved without intervention.   No complaints. No dysphagia, odynophagia, GERD, NV, or abd pain. Regular BMs.     Diet: compliant with bariatric meal plan, tolerating bariatric clears/puree  Exercise: regular exercise, walking   Vitamins: compliant with bariatric supplementation per protocol                Pertinent History:  HTN - [] Yes   [x] No    [] Resolved  HLD - [] Yes   [x] No    [] Resolved  DM  -  [] Yes   [x] No    [] Resolved  DEEPAK - [] Yes   [x] No   [] Resolved  GERD - [] Yes   [x] No [] Resolved  Anticoagulation- [] Yes   [x] No  If yes, type: [] ASA [] Plavix [] Other    Patient Care Team:  Jevon Escobar MD as PCP - General (Family Medicine)  Ted Higginbotham MD as Surgeon (Bariatrics)  Deborah Bowman LPN as Licensed Practical Nurse (Bariatrics)     Review of Systems:    12 point review of systems conducted, negative except as stated in the history of present illness. See HPI for details.    Review of patient's allergies indicates:  No Known Allergies  Past Medical History:   Diagnosis Date    ADHD     Allergies     Anxiety     Depression     Gastroesophageal reflux disease, unspecified whether esophagitis present     GERD (gastroesophageal reflux disease)     Headache due to  anesthesia     History of stomach ulcers     IBS (irritable bowel syndrome)     Irregular heart beat     [place] grade D esophagitis     Nausea & vomiting     Obesity     PONV (postoperative nausea and vomiting)      Past Surgical History:   Procedure Laterality Date     SECTION      X2    CHOLECYSTECTOMY  10/03/2012    COLONOSCOPY      ESOPHAGOGASTRODUODENOSCOPY      a few    GASTROJEJUNOSTOMY, LAPAROSCOPIC  2024    Procedure: GASTROJEJUNOSTOMY, LAPAROSCOPIC;  Surgeon: Ted Higginbotham MD;  Location: [facility] OR;  Service: General;;  WITH AUTIN    HERNIA REPAIR      HYSTERECTOMY      LAPAROSCOPIC SLEEVE GASTRECTOMY  2021    Dr. Higginbotham     Family History   Problem Relation Name Age of Onset    Hypertension Maternal Grandmother       Social History     Tobacco Use    Smoking status: Never    Smokeless tobacco: Never   Substance Use Topics    Alcohol use: Not Currently    Drug use: Never      Current Outpatient Medications   Medication Instructions    Lactobac no.41/Bifidobact no.7 (PROBIOTIC-10 ORAL) 1 capsule, Oral, Daily    melatonin 5 mg Cap Oral    montelukast (SINGULAIR) 10 mg, Oral, Daily    traZODone (DESYREL) 100 mg, Oral, Nightly    VRAYLAR 3 mg, Oral, Daily       Objective:     Vital Signs (Most Recent):      Physical Exam:  General: Alert and oriented, No acute distress.  Respiratory: Clear to auscultation bilaterally; No wheezes, rales or rhonchi,Non-labored respirations  Cardiovascular: Regular rate and rhythm   Gastrointestinal: Abd soft, Non-tender, Non-distended, Bowel sounds present, Lap sites clean dry and intact, no evidence of infection.   Musculoskeletal: Normal range of motion.  Integumentary: Warm, Dry, Intact  Neurologic: No focal deficits  Psychiatric: Appropriate affect    Assessment and Plan:     No diagnosis found.    4  weeks post op s/p Laparoscopic Jerrica-en-Y Gastric Bypass    - Follow up in 4 weeks with bariatric labs  - Continue diet per bariatric protocol  -  Exercise encouraged  - Continue vitamin supplementation  - Support group attendance encouraged  - Keep routine appts with PCP/specialists as scheduled

## 2024-08-19 ENCOUNTER — TELEPHONE (OUTPATIENT)
Dept: SURGERY | Facility: CLINIC | Age: 39
End: 2024-08-19
Payer: COMMERCIAL

## 2024-08-19 NOTE — TELEPHONE ENCOUNTER
Attempted to contact pt to as a reminder to have labs drawn prior to her upcoming appt   No answer, LM   Orders were placed internally at her last appt

## 2024-08-29 NOTE — TELEPHONE ENCOUNTER
Attempted to contact pt to ensure she gets labs completed prior to her appt   No answer,LM     Looks like there are orders already in the chart

## 2024-10-09 ENCOUNTER — TELEPHONE (OUTPATIENT)
Dept: SURGERY | Facility: CLINIC | Age: 39
End: 2024-10-09
Payer: COMMERCIAL

## 2024-10-09 NOTE — TELEPHONE ENCOUNTER
Attempted to contact patient to ensure labs are done prior to her upcoming appt no answer, LM     Active orders in chart

## 2024-11-07 ENCOUNTER — PATIENT MESSAGE (OUTPATIENT)
Dept: SURGERY | Facility: CLINIC | Age: 39
End: 2024-11-07
Payer: COMMERCIAL

## 2024-11-07 ENCOUNTER — TELEPHONE (OUTPATIENT)
Dept: SURGERY | Facility: CLINIC | Age: 39
End: 2024-11-07
Payer: COMMERCIAL

## 2024-11-07 NOTE — TELEPHONE ENCOUNTER
Attempted to contact patient no answer, LM also sent portal message for patient to have labs completed prior to her appt     Orders in chart

## 2024-11-17 LAB
ALBUMIN SERPL-MCNC: 4.6 G/DL (ref 3.9–4.9)
ALP SERPL-CCNC: 72 IU/L (ref 44–121)
ALT SERPL-CCNC: 24 IU/L (ref 0–32)
AST SERPL-CCNC: 21 IU/L (ref 0–40)
BASOPHILS # BLD AUTO: 0.1 X10E3/UL (ref 0–0.2)
BASOPHILS NFR BLD AUTO: 3 %
BILIRUB SERPL-MCNC: 0.3 MG/DL (ref 0–1.2)
BUN SERPL-MCNC: 12 MG/DL (ref 6–20)
BUN/CREAT SERPL: 21 (ref 9–23)
CALCIUM SERPL-MCNC: 9.3 MG/DL (ref 8.7–10.2)
CHLORIDE SERPL-SCNC: 103 MMOL/L (ref 96–106)
CO2 SERPL-SCNC: 25 MMOL/L (ref 20–29)
CREAT SERPL-MCNC: 0.57 MG/DL (ref 0.57–1)
EOSINOPHIL # BLD AUTO: 0.1 X10E3/UL (ref 0–0.4)
EOSINOPHIL NFR BLD AUTO: 3 %
ERYTHROCYTE [DISTWIDTH] IN BLOOD BY AUTOMATED COUNT: 12.1 % (ref 11.7–15.4)
EST. GFR  (NO RACE VARIABLE): 120 ML/MIN/1.73
GLOBULIN SER CALC-MCNC: 1.8 G/DL (ref 1.5–4.5)
GLUCOSE SERPL-MCNC: 88 MG/DL (ref 70–99)
HCT VFR BLD AUTO: 39.1 % (ref 34–46.6)
HGB BLD-MCNC: 13.5 G/DL (ref 11.1–15.9)
IMM GRANULOCYTES NFR BLD AUTO: 1 %
IRON SATN MFR SERPL: 43 % (ref 15–55)
IRON SERPL-MCNC: 123 UG/DL (ref 27–159)
LYMPHOCYTES # BLD AUTO: 1.4 X10E3/UL (ref 0.7–3.1)
LYMPHOCYTES NFR BLD AUTO: 32 %
MCH RBC QN AUTO: 31.2 PG (ref 26.6–33)
MCHC RBC AUTO-ENTMCNC: 34.5 G/DL (ref 31.5–35.7)
MCV RBC AUTO: 90 FL (ref 79–97)
MONOCYTES # BLD AUTO: 0.3 X10E3/UL (ref 0.1–0.9)
MONOCYTES NFR BLD AUTO: 8 %
NEUTROPHILS # BLD AUTO: 2.4 X10E3/UL (ref 1.4–7)
NEUTROPHILS NFR BLD AUTO: 53 %
PLATELET # BLD AUTO: 303 X10E3/UL (ref 150–450)
POTASSIUM SERPL-SCNC: 4.8 MMOL/L (ref 3.5–5.2)
PROT SERPL-MCNC: 6.4 G/DL (ref 6–8.5)
RBC # BLD AUTO: 4.33 X10E6/UL (ref 3.77–5.28)
SODIUM SERPL-SCNC: 142 MMOL/L (ref 134–144)
SPECIMEN STATUS REPORT: NORMAL
TIBC SERPL-MCNC: 285 UG/DL (ref 250–450)
UIBC SERPL-MCNC: 162 UG/DL (ref 131–425)
VIT B1 BLD-SCNC: 127.1 NMOL/L (ref 66.5–200)
VIT B12 SERPL-MCNC: 730 PG/ML (ref 232–1245)
WBC # BLD AUTO: 4.3 X10E3/UL (ref 3.4–10.8)

## 2024-11-18 ENCOUNTER — PATIENT MESSAGE (OUTPATIENT)
Dept: SURGERY | Facility: CLINIC | Age: 39
End: 2024-11-18

## 2024-11-18 ENCOUNTER — OFFICE VISIT (OUTPATIENT)
Dept: SURGERY | Facility: CLINIC | Age: 39
End: 2024-11-18
Payer: COMMERCIAL

## 2024-11-18 VITALS
HEIGHT: 65 IN | BODY MASS INDEX: 26.1 KG/M2 | WEIGHT: 156.63 LBS | SYSTOLIC BLOOD PRESSURE: 144 MMHG | DIASTOLIC BLOOD PRESSURE: 89 MMHG | HEART RATE: 93 BPM

## 2024-11-18 DIAGNOSIS — Z71.3 DIETARY COUNSELING: ICD-10-CM

## 2024-11-18 DIAGNOSIS — Z71.3 ENCOUNTER FOR WEIGHT LOSS COUNSELING: ICD-10-CM

## 2024-11-18 DIAGNOSIS — Z91.89 ELECTROLYTE IMBALANCE RISK: ICD-10-CM

## 2024-11-18 DIAGNOSIS — Z71.82 EXERCISE COUNSELING: ICD-10-CM

## 2024-11-18 DIAGNOSIS — R53.83 FATIGUE, UNSPECIFIED TYPE: ICD-10-CM

## 2024-11-18 DIAGNOSIS — Z13.21 ENCOUNTER FOR VITAMIN DEFICIENCY SCREENING: Primary | ICD-10-CM

## 2024-11-18 DIAGNOSIS — K20.80 ESOPHAGITIS, LOS ANGELES GRADE D: ICD-10-CM

## 2024-11-18 DIAGNOSIS — K21.9 GASTROESOPHAGEAL REFLUX DISEASE, UNSPECIFIED WHETHER ESOPHAGITIS PRESENT: ICD-10-CM

## 2024-11-18 DIAGNOSIS — Z13.0 SCREENING, ANEMIA, DEFICIENCY, IRON: ICD-10-CM

## 2024-11-18 DIAGNOSIS — E66.9 OBESITY, UNSPECIFIED CLASS, UNSPECIFIED OBESITY TYPE, UNSPECIFIED WHETHER SERIOUS COMORBIDITY PRESENT: ICD-10-CM

## 2024-11-18 DIAGNOSIS — Z98.84 S/P GASTRIC BYPASS: ICD-10-CM

## 2024-11-18 PROCEDURE — 99214 OFFICE O/P EST MOD 30 MIN: CPT | Mod: ,,, | Performed by: NURSE PRACTITIONER

## 2024-11-18 PROCEDURE — 3079F DIAST BP 80-89 MM HG: CPT | Mod: CPTII,,, | Performed by: NURSE PRACTITIONER

## 2024-11-18 PROCEDURE — 1159F MED LIST DOCD IN RCRD: CPT | Mod: CPTII,,, | Performed by: NURSE PRACTITIONER

## 2024-11-18 PROCEDURE — 3008F BODY MASS INDEX DOCD: CPT | Mod: CPTII,,, | Performed by: NURSE PRACTITIONER

## 2024-11-18 PROCEDURE — 3077F SYST BP >= 140 MM HG: CPT | Mod: CPTII,,, | Performed by: NURSE PRACTITIONER

## 2024-11-18 RX ORDER — VENLAFAXINE HYDROCHLORIDE 150 MG/1
150 CAPSULE, EXTENDED RELEASE ORAL DAILY
COMMUNITY

## 2024-11-18 NOTE — PROGRESS NOTES
"Progress Note  Lisa Celestin  Chief Complaint   Patient presents with    Follow-up     2/26/24 lap gastroj - 6 month     History of Present Illness:  Ms. Lisa Celestin is a 39 y.o. female who is 6 mth s/p lap revisional gastric bypass surgery with hiatal hernia repair for intractable reflux on 2/26/24 by Ted Higginbotham MD. Presents today for 6 mth follow up appt. No complaints with tolerating PO. No  GERD, NV, or abd pain. Regular BMs.      Previous history of Lap sleeve gastrectomy 2/1/2021 with Dr. Higginbotham.  She states she had reflux and heartburn symptoms prior to sleeve gastrectomy.     Admission and DC from hospital from 3/11-3/14/24 for small bowel obstruction at JJ anastomosis. Bowel rest and advanced diet without any issues or surgical intervention. Since that time her only complaint has been mild dysphasia.      - pt denies having any obstructive sympomts since her hospital stay. Pt is able to tolerate all proteins now without any issues of globus sensation or dysphagia. Doing well. Not compliant on diet or exercising. Feeling very "exhaused by 2pm daily" but knows she is not getting the nutrition that she needs and is not taking her vitamins.      Diet: not compliant with bariatric meal plan  Exercise: no regular exercise  Vitamins: not compliant with bariatric supplementation          Labs (11/14/24): WNLs      Labs (2/24/24): WNLs     Pre op VSG weight 248 #  2 weeks post op 231 #  2 months post op 211 #  6 months post op 186 #          BMI 30.95 (Eugenio weight)  2.5 years post op 200 #          BMI 33.28         HT: 65in  Weights:   Trend Weights BMI   Starting 205# 33   2 Week 189#     2 Month 179# 29   6 Month  156#  26    1 Year       2 Year                  For Adults 20 Years and Older:  BMI Weight Status   Below 18.5 Underweight   18.6-24.9 Normal/Healthy   25.0-29.9 Overweight   30.0 & Above Obese      Ideal Weight Range for Your Height: 5'5" = 114 - 149 lbs                  Pertinent " History:  HTN - [] Yes   [x] No  HLD - [] Yes   [x] No  DM  -  [] Yes   [x] No  DEEPAK - [] Yes   [x] No  GERD - [] Yes   [x] No, resolved   Anticoagulation- [] Yes   [x] No    Patient Care Team:  Jevon Escobar MD as PCP - General (Family Medicine)  Ted Higginbotham MD as Surgeon (Bariatrics)  Deborah Bowman LPN as Licensed Practical Nurse (Bariatrics)     Subjective:     12 point review of systems conducted, negative except as stated in the history of present illness. See HPI for details.    Review of patient's allergies indicates:  No Known Allergies  Past Medical History:   Diagnosis Date    ADHD     Allergies     Anxiety     Depression     Gastroesophageal reflux disease, unspecified whether esophagitis present     GERD (gastroesophageal reflux disease)     Headache due to anesthesia     History of stomach ulcers     IBS (irritable bowel syndrome)     Irregular heart beat     [place] grade D esophagitis     Nausea & vomiting     Obesity     PONV (postoperative nausea and vomiting)      Past Surgical History:   Procedure Laterality Date     SECTION      X2    CHOLECYSTECTOMY  10/03/2012    COLONOSCOPY      ESOPHAGOGASTRODUODENOSCOPY      a few    GASTROJEJUNOSTOMY, LAPAROSCOPIC  2024    Procedure: GASTROJEJUNOSTOMY, LAPAROSCOPIC;  Surgeon: Ted Higginbotham MD;  Location: [facility];  Service: General;;  WITH AUTIN    HERNIA REPAIR      HYSTERECTOMY      LAPAROSCOPIC SLEEVE GASTRECTOMY  2021    Dr. Higginbotham     Family History   Problem Relation Name Age of Onset    Hypertension Maternal Grandmother       Social History     Tobacco Use    Smoking status: Never    Smokeless tobacco: Never   Substance Use Topics    Alcohol use: Not Currently    Drug use: Never      Current Outpatient Medications   Medication Instructions    montelukast (SINGULAIR) 10 mg, Daily    multivit-min/iron/folic acid/K (BARIATRIC MULTIVITAMINS ORAL) Take by mouth.    traZODone (DESYREL) 100 mg, Nightly     "venlafaxine (EFFEXOR-XR) 150 mg, Daily       Objective:     Vital Signs (Most Recent):  Visit Vitals  BP (!) 144/89   Pulse 93   Ht 5' 5" (1.651 m)   Wt 71 kg (156 lb 9.6 oz)   BMI 26.06 kg/m²       Physical Exam:  General:  Alert and oriented.    Respiratory:  Lungs are clear to auscultation, Respirations are non-labored, Breath sounds are equal.    Cardiovascular:  Normal rate, Regular rhythm, No murmur.    Gastrointestinal:  Soft, Non-tender, Non-distended, Normal bowel sounds    Musculoskeletal:  Normal range of motion, Normal strength.    Integumentary:  Warm, Dry, Pink.    Neurologic:  Alert, Oriented.    Psychiatric:  Cooperative.      Assessment:       ICD-10-CM ICD-9-CM   1. Encounter for vitamin deficiency screening  Z13.21 V77.99   2. Screening, anemia, deficiency, iron  Z13.0 V78.0   3. Electrolyte imbalance risk  Z91.89 V49.89   4. Encounter for weight loss counseling  Z71.3 V65.3   5. Dietary counseling  Z71.3 V65.3   6. Exercise counseling  Z71.82 V65.41   7. Obesity, unspecified class, unspecified obesity type, unspecified whether serious comorbidity present  E66.9 278.00   8. Fatigue, unspecified type  R53.83 780.79   9. Gastroesophageal reflux disease, unspecified whether esophagitis present  K21.9 530.81   10. Esophagitis, [place] grade D  K20.80 530.19   11. S/P gastric bypass  Z98.84 V45.86       Plan:     1. Encounter for vitamin deficiency screening        2. Screening, anemia, deficiency, iron        3. Electrolyte imbalance risk        4. Encounter for weight loss counseling        5. Dietary counseling        6. Exercise counseling        7. Obesity, unspecified class, unspecified obesity type, unspecified whether serious comorbidity present        8. Fatigue, unspecified type        9. Gastroesophageal reflux disease, unspecified whether esophagitis present        10. Esophagitis, [place] grade D        11. S/P gastric bypass              - fatigue, pt understands that she needs to " hit protein goal daily and take vitamins in order to not feel fatigued. Pt also needs to stick to diet so that she does not begin to gain weight. Pt would like to lose another 5-10lbs.       - referral to Dr. Renner for EGD. Hx of grade d esophagitis prior to RNYGB and would like to know if it is resolved.        - needs to begin dedicated exercise if she would like to lose another 5-10lbs.   - gave pt information about the bariatric one a day multivitamin. Need to continue calcium supplements while on them. continue  vitamin since it contains iron. please eat before taking, it can make pt's very nauseated on empty stomach.  - F/U 6 months with bariatric labs  - Continue diet  - Continue vitamin supplementation  - Support group attendance encouraged  - Keep routine appts with PCP/specialists as scheduled

## 2025-02-17 ENCOUNTER — TELEPHONE (OUTPATIENT)
Dept: SURGERY | Facility: CLINIC | Age: 40
End: 2025-02-17
Payer: COMMERCIAL

## 2025-02-17 NOTE — TELEPHONE ENCOUNTER
Spoke with patient   She stated that she will go to [facility] in [facility] sent order

## 2025-02-20 NOTE — TELEPHONE ENCOUNTER
Attempted to contact patient no answer,CHANDANA   - I do not have any records of labs being completed at this time

## 2025-02-25 ENCOUNTER — TELEPHONE (OUTPATIENT)
Dept: SURGERY | Facility: CLINIC | Age: 40
End: 2025-02-25

## 2025-03-13 ENCOUNTER — RESULTS FOLLOW-UP (OUTPATIENT)
Dept: SURGERY | Facility: CLINIC | Age: 40
End: 2025-03-13

## 2025-03-13 NOTE — PROGRESS NOTES
Duplicate message. Soraya Guerra NP already discussed results with patient at most recent appointment on 11/18/24

## 2025-03-14 ENCOUNTER — PATIENT MESSAGE (OUTPATIENT)
Dept: SURGERY | Facility: CLINIC | Age: 40
End: 2025-03-14
Payer: COMMERCIAL

## 2025-03-19 NOTE — PROGRESS NOTES
"Progress Note  Lisa Celestin  No chief complaint on file.    History of Present Illness:  Ms. Lisa Celestin is a 39 y.o. female who is 6 mth s/p lap revisional gastric bypass surgery with hiatal hernia repair for intractable reflux on 2/26/24 by Ted Higginbotham MD. Presents today for 6 mth follow up appt. No complaints with tolerating PO. No  GERD, NV, or abd pain. Regular BMs.      Previous history of Lap sleeve gastrectomy 2/1/2021 with Dr. Higginbotham.  She states she had reflux and heartburn symptoms prior to sleeve gastrectomy.      Admission and DC from hospital from 3/11-3/14/24 for small bowel obstruction at JJ anastomosis. Bowel rest and advanced diet without any issues or surgical intervention. Since that time her only complaint has been mild dysphasia.      - pt reports that she is still feeling pretty fatigued during the day but again not taking her vitamins and also "eating on the go" because she is so busy. Not hitting a protein goal or exercising.      Diet: not compliant with bariatric meal plan  Exercise: no regular exercise  Vitamins: not compliant with bariatric supplementation          Labs (3/17/25): AST: 38, need b1, b12 and iron panel       Labs (11/14/24): WNLs      Labs (2/24/24): WNLs     Pre op VSG weight 248 #  2 weeks post op 231 #  2 months post op 211 #  6 months post op 186 #          BMI 30.95 (Eugenio weight)  2.5 years post op 200 #          BMI 33.28         HT: 65in  Weights:   Trend Weights BMI   Starting 205# 33   2 Week 189#     2 Month 179# 29   6 Month  156#  26    1 Year  157# 26   2 Year                  For Adults 20 Years and Older:  BMI Weight Status   Below 18.5 Underweight   18.6-24.9 Normal/Healthy   25.0-29.9 Overweight   30.0 & Above Obese      Ideal Weight Range for Your Height: 5'5" = 114 - 149 lbs                  Pertinent History:  HTN - [] Yes   [x] No  HLD - [] Yes   [x] No  DM  -  [] Yes   [x] No  DEEPAK - [] Yes   [x] No  GERD - [] Yes   [x] No, " resolved   Anticoagulation- [] Yes   [x] No    Patient Care Team:  Ted Higginbotham MD as Surgeon (Bariatrics)  Deborah Bowman LPN as Licensed Practical Nurse (Bariatrics)  Frederic Menezes MD (Family Medicine)     Subjective:     12 point review of systems conducted, negative except as stated in the history of present illness. See HPI for details.    Review of patient's allergies indicates:  No Known Allergies  Past Medical History:   Diagnosis Date    ADHD     Allergies     Anxiety     Depression     Gastroesophageal reflux disease, unspecified whether esophagitis present     GERD (gastroesophageal reflux disease)     Headache due to anesthesia     History of stomach ulcers     IBS (irritable bowel syndrome)     Irregular heart beat     [place] grade D esophagitis     Nausea & vomiting     Obesity     PONV (postoperative nausea and vomiting)      Past Surgical History:   Procedure Laterality Date     SECTION      X2    CHOLECYSTECTOMY  10/03/2012    COLONOSCOPY      ESOPHAGOGASTRODUODENOSCOPY      a few    GASTROJEJUNOSTOMY, LAPAROSCOPIC  2024    Procedure: GASTROJEJUNOSTOMY, LAPAROSCOPIC;  Surgeon: Ted Higginbotham MD;  Location: [facility];  Service: General;;  WITH AUTIN    HERNIA REPAIR      HYSTERECTOMY      LAPAROSCOPIC SLEEVE GASTRECTOMY  2021    Dr. Higginbotham     Family History   Problem Relation Name Age of Onset    Hypertension Maternal Grandmother       Social History[1]   Current Outpatient Medications   Medication Instructions    montelukast (SINGULAIR) 10 mg, Daily    multivit-min/iron/folic acid/K (BARIATRIC MULTIVITAMINS ORAL) Take by mouth.    traZODone (DESYREL) 100 mg, Nightly    venlafaxine (EFFEXOR-XR) 150 mg, Daily       Objective:     Vital Signs (Most Recent):  There were no vitals taken for this visit.    Physical Exam:  General:  Alert and oriented.    Respiratory:  Lungs are clear to auscultation, Respirations are non-labored, Breath sounds are equal.     Cardiovascular:  Normal rate, Regular rhythm, No murmur.    Gastrointestinal:  Soft, Non-tender, Non-distended, Normal bowel sounds        Musculoskeletal:  Normal range of motion, Normal strength.    Integumentary:  Warm, Dry, Pink.    Neurologic:  Alert, Oriented.    Psychiatric:  Cooperative.      Assessment:       ICD-10-CM ICD-9-CM   1. S/P gastric bypass  Z98.84 V45.86   2. Exercise counseling  Z71.82 V65.41   3. Dietary counseling  Z71.3 V65.3   4. Encounter for weight loss counseling  Z71.3 V65.3   5. Overweight  E66.3 278.02   6. Fatigue, unspecified type  R53.83 780.79       Plan:     1. S/P gastric bypass        2. Exercise counseling        3. Dietary counseling        4. Encounter for weight loss counseling        5. Overweight        6. Fatigue, unspecified type            - need b1, b12, and iron panel   - encouraged pt to go back to meal prepping or using pre prepped meals from [employer] in [place]. Pt needs to hit protein goal daily and get in nutrition so that she can fuel her body appropriately so that she does not feel fatigued. Also needs to take bariatric vitamins.   - gave pt information about the bariatric one a day multivitamin. Need to continue calcium supplements while on them. continue  vitamin since it contains iron. please eat before taking, it can make pt's very nauseated on empty stomach.   - Discussed possible surgical risks with patient that can occur at any point in time such as but not limited to internal hernia, vitamin and mineral deficiency, dumping syndrome, ulceration from smoking/NSAIDs/Anti-inflammatory medications, gallbladder disfunction, etc. If patient has any severe abd pain that is constant, nausea, vomiting, disruption of bowel movements, or has other concerns they are instructed to call the office or present to the emergency room. Pt verbalized understanding   - F/U 1 year with bariatric labs (annually)  - Continue exercising and following bariatric  vitamin supplementation  - Support group attendance encouraged    - Keep routine appts with PCP/specialists as scheduled                    [1]   Social History  Tobacco Use    Smoking status: Never    Smokeless tobacco: Never   Substance Use Topics    Alcohol use: Not Currently    Drug use: Never

## 2025-03-20 ENCOUNTER — PATIENT MESSAGE (OUTPATIENT)
Dept: SURGERY | Facility: CLINIC | Age: 40
End: 2025-03-20

## 2025-03-20 ENCOUNTER — TELEPHONE (OUTPATIENT)
Dept: SURGERY | Facility: CLINIC | Age: 40
End: 2025-03-20

## 2025-03-20 ENCOUNTER — OFFICE VISIT (OUTPATIENT)
Dept: SURGERY | Facility: CLINIC | Age: 40
End: 2025-03-20
Payer: COMMERCIAL

## 2025-03-20 DIAGNOSIS — Z71.82 EXERCISE COUNSELING: ICD-10-CM

## 2025-03-20 DIAGNOSIS — E66.3 OVERWEIGHT: ICD-10-CM

## 2025-03-20 DIAGNOSIS — Z98.84 S/P GASTRIC BYPASS: Primary | ICD-10-CM

## 2025-03-20 DIAGNOSIS — Z71.3 DIETARY COUNSELING: ICD-10-CM

## 2025-03-20 DIAGNOSIS — Z71.3 ENCOUNTER FOR WEIGHT LOSS COUNSELING: ICD-10-CM

## 2025-03-20 DIAGNOSIS — R53.83 FATIGUE, UNSPECIFIED TYPE: ICD-10-CM

## 2025-03-20 NOTE — TELEPHONE ENCOUNTER
We need to call [facility] to see if they even kaitlynn a b1, b12, or iron panel. Pt said that she brought our orders and her PCPs.

## 2025-03-21 ENCOUNTER — PATIENT MESSAGE (OUTPATIENT)
Dept: SURGERY | Facility: CLINIC | Age: 40
End: 2025-03-21
Payer: COMMERCIAL

## 2025-03-21 LAB
FT4I SERPL CALC-MCNC: 2.3 (ref 1.2–4.9)
HBA1C MFR BLD: 4.6 % (ref 4.8–5.6)
IRON SATN MFR SERPL: 37 % (ref 15–55)
IRON SERPL-MCNC: 111 UG/DL (ref 27–159)
T3RU NFR SERPL: 28 % (ref 24–39)
T4 SERPL-MCNC: 8.1 UG/DL (ref 4.5–12)
TIBC SERPL-MCNC: 297 UG/DL (ref 250–450)
UIBC SERPL-MCNC: 186 UG/DL (ref 131–425)
VIT B1 BLD-SCNC: 155.8 NMOL/L (ref 66.5–200)
VIT B12 SERPL-MCNC: 729 PG/ML (ref 232–1245)

## 2025-08-04 ENCOUNTER — TELEPHONE (OUTPATIENT)
Dept: BARIATRICS | Facility: HOSPITAL | Age: 40
End: 2025-08-04
Payer: COMMERCIAL

## 2025-08-04 NOTE — TELEPHONE ENCOUNTER
Called pt to discuss recent low blood sugars. Discussed treating blood sugars lower than 70 with 15 g of carbs (recommended glucose tabs post bariatric surgery) and then waiting 15 minutes to ensure blood sugar was back up. Told pt where to puchase glucose tabs- she said she purchased monitor and has been checking her sugars when she feels bad/shaky. Also discussed foods to avoid to prevent having a sudden drop in sugars. Pt understood- said she would call with any other questions.

## 2025-08-04 NOTE — TELEPHONE ENCOUNTER
"----- Message from Med Assistant Hay sent at 8/1/2025 11:56 AM CDT -----  Regarding: RE: Patient Call Back  Forwarding  to dieticians.     Hey - please see message below and contact pt to discuss  ----- Message -----  From: Fabio Menezes  Sent: 8/1/2025  10:26 AM CDT  To: Anahy Jean Staff  Subject: Patient Call Back                                VSG 2/1/21 2/26/24 lap gastroj     Patient called and was unsure who she would follow up with regarding "sugar drops when she eats certain things"    I advised patient that someone would reach out to discuss this with her, confirmed call back number in Epic.  "

## (undated) DEVICE — SUT SILK 3-0 BLK BR SH 30IN

## (undated) DEVICE — TRAY CATH FOL SIL URIMTR 16FR

## (undated) DEVICE — NDL 10CC 20GAX1 COMBO

## (undated) DEVICE — SUT COATED VICRYL 3-0 1X27

## (undated) DEVICE — KIT GEN LAPAROSCOPY LAFAYETTE

## (undated) DEVICE — HOLDER STRIP-T SELF ADH 2X10IN

## (undated) DEVICE — CHLORAPREP W TINT 26ML APPL

## (undated) DEVICE — POSITIONER HEEL FOAM CONVOLTD

## (undated) DEVICE — PAD PREP 50/CA

## (undated) DEVICE — SUT TK QUIK LOAD

## (undated) DEVICE — TROCAR ENDOPATH XCEL 11MM 10CM

## (undated) DEVICE — SHEARS HS LONG 5MM CURVED

## (undated) DEVICE — SUT ABS CLIP LAPRA-TY CTD

## (undated) DEVICE — COVER CAMERA/LASER 9X96IN

## (undated) DEVICE — SUT PROLENE 1 CTX 30IN BLUE

## (undated) DEVICE — BINDER ABD 12IN LG 63-74IN

## (undated) DEVICE — TROCAR ENDOPATH XCEL 5X100MM

## (undated) DEVICE — TROCAR ENDOPATH EXCEL DILATING

## (undated) DEVICE — DRAPE SLUSH WARMER 66X44IN

## (undated) DEVICE — BAG SPEC RETRV ENDO 4X6IN DISP

## (undated) DEVICE — ELECTRODE PATIENT RETURN DISP

## (undated) DEVICE — MATTRESS HOVERMAT TRNSF 34X78

## (undated) DEVICE — DRAIN SURG HUBLESS 30CM 15FR

## (undated) DEVICE — RELOAD ECHELON FLEX GRN 60MM

## (undated) DEVICE — SUT ETHIBOND #0 EN-3 112CM

## (undated) DEVICE — DRAIN PENROSE SIL .25X12IN

## (undated) DEVICE — COVER MAYO STAND REINFRCD 30

## (undated) DEVICE — STAPLER ECHELON LONG 60X440MM

## (undated) DEVICE — CLOSURE SKIN STERI STRIP 1/2X4

## (undated) DEVICE — SUT VICRYL+ 27 UR-6 VIOL

## (undated) DEVICE — CLIP ENDO LIGATION LARGE CLIPS

## (undated) DEVICE — STAPLER ANVIL DST EEA XL 25MM

## (undated) DEVICE — TROCAR LAPSCP XCEL 12MM 10CM

## (undated) DEVICE — RESERVOIR JACKSON-PRATT 100CC

## (undated) DEVICE — ANVIL ACCESSORY 25MM DST

## (undated) DEVICE — TROCAR ENDOPATH XCEL 12X100MM

## (undated) DEVICE — SOL IRRI STRL WATER 1000ML

## (undated) DEVICE — RELOAD ECHELON FLEX BLU 60MM

## (undated) DEVICE — IRRIGATOR HYDRO-SURG PLUS 5MM

## (undated) DEVICE — SYR LL ECLIPSE 10ML 21G 1.5IN

## (undated) DEVICE — GLOVE PROTEXIS HYDROGEL SZ7.5

## (undated) DEVICE — TOWEL OR BLUE STRL 16X26 4/PK

## (undated) DEVICE — BOWL STERILE LARGE 32OZ

## (undated) DEVICE — SUT VICRYL PLUS 4-0 FS-2 27IN

## (undated) DEVICE — Device

## (undated) DEVICE — KIT SURGICAL TURNOVER

## (undated) DEVICE — CUSHION  WC FOAM 20X20X.75IN

## (undated) DEVICE — DEVICE TK TI-KNOT 5MM REPL DEV

## (undated) DEVICE — SCISSOR CURVED ENDOPATH 5MM

## (undated) DEVICE — RELOAD ECHELON FLEX WHT 60MM

## (undated) DEVICE — GOWN SMARTSLEEVE AAMI LVL4 LG

## (undated) DEVICE — SUT ETHILON 3-0 FS-1 30